# Patient Record
Sex: FEMALE | Race: WHITE | NOT HISPANIC OR LATINO | Employment: UNEMPLOYED | URBAN - NONMETROPOLITAN AREA
[De-identification: names, ages, dates, MRNs, and addresses within clinical notes are randomized per-mention and may not be internally consistent; named-entity substitution may affect disease eponyms.]

---

## 2017-04-26 ENCOUNTER — TRANSCRIBE ORDERS (OUTPATIENT)
Dept: ADMINISTRATIVE | Facility: HOSPITAL | Age: 72
End: 2017-04-26

## 2017-04-26 DIAGNOSIS — R41.3 MEMORY LOSS: Primary | ICD-10-CM

## 2017-04-26 DIAGNOSIS — R41.2 AMNESIA (RETROGRADE): ICD-10-CM

## 2017-04-27 ENCOUNTER — HOSPITAL ENCOUNTER (OUTPATIENT)
Dept: CT IMAGING | Facility: HOSPITAL | Age: 72
Discharge: HOME | End: 2017-04-27
Admitting: NURSE PRACTITIONER

## 2017-04-27 DIAGNOSIS — R41.2 AMNESIA (RETROGRADE): ICD-10-CM

## 2017-04-27 DIAGNOSIS — R41.3 MEMORY LOSS: ICD-10-CM

## 2017-04-27 LAB — CREAT BLDA-MCNC: 0.7 MG/DL (ref 0.6–1.3)

## 2017-04-27 PROCEDURE — 82565 ASSAY OF CREATININE: CPT

## 2017-04-27 PROCEDURE — 0 IOPAMIDOL 61 % SOLUTION: Performed by: NURSE PRACTITIONER

## 2017-04-27 PROCEDURE — 70470 CT HEAD/BRAIN W/O & W/DYE: CPT

## 2017-04-27 PROCEDURE — 70470 CT HEAD/BRAIN W/O & W/DYE: CPT | Performed by: RADIOLOGY

## 2017-04-27 RX ADMIN — IOPAMIDOL 100 ML: 612 INJECTION, SOLUTION INTRAVENOUS at 09:30

## 2017-05-23 ENCOUNTER — LAB (OUTPATIENT)
Dept: LAB | Facility: HOSPITAL | Age: 72
End: 2017-05-23

## 2017-05-23 ENCOUNTER — HOSPITAL ENCOUNTER (OUTPATIENT)
Dept: GENERAL RADIOLOGY | Facility: HOSPITAL | Age: 72
Discharge: HOME | End: 2017-05-23
Admitting: NURSE PRACTITIONER

## 2017-05-23 ENCOUNTER — TRANSCRIBE ORDERS (OUTPATIENT)
Dept: ADMINISTRATIVE | Facility: HOSPITAL | Age: 72
End: 2017-05-23

## 2017-05-23 DIAGNOSIS — R41.3 MEMORY LOSS: ICD-10-CM

## 2017-05-23 DIAGNOSIS — J40 BRONCHITIS, NOT SPECIFIED AS ACUTE OR CHRONIC: ICD-10-CM

## 2017-05-23 DIAGNOSIS — I10 ESSENTIAL HYPERTENSION, BENIGN: ICD-10-CM

## 2017-05-23 DIAGNOSIS — R41.3 MEMORY LOSS: Primary | ICD-10-CM

## 2017-05-23 LAB
ALBUMIN SERPL-MCNC: 3.6 G/DL (ref 3.4–4.8)
ALBUMIN UR-MCNC: 0.9 MG/L
ALBUMIN/GLOB SERPL: 0.9 G/DL (ref 1.5–2.5)
ALP SERPL-CCNC: 80 U/L (ref 35–104)
ALT SERPL W P-5'-P-CCNC: 13 U/L (ref 10–36)
ANION GAP SERPL CALCULATED.3IONS-SCNC: 1.6 MMOL/L (ref 3.6–11.2)
AST SERPL-CCNC: 20 U/L (ref 10–30)
BILIRUB SERPL-MCNC: 0.3 MG/DL (ref 0.2–1.8)
BUN BLD-MCNC: 9 MG/DL (ref 7–21)
BUN/CREAT SERPL: 13.8 (ref 7–25)
CALCIUM SPEC-SCNC: 9.3 MG/DL (ref 7.7–10)
CHLORIDE SERPL-SCNC: 101 MMOL/L (ref 99–112)
CHOLEST SERPL-MCNC: 153 MG/DL (ref 0–200)
CO2 SERPL-SCNC: 35.4 MMOL/L (ref 24.3–31.9)
CREAT BLD-MCNC: 0.65 MG/DL (ref 0.43–1.29)
CREAT UR-MCNC: 25.8 MG/DL
DEPRECATED RDW RBC AUTO: 44.1 FL (ref 37–54)
EOSINOPHIL # BLD MANUAL: 0.27 10*3/MM3 (ref 0–0.7)
EOSINOPHIL NFR BLD MANUAL: 4 % (ref 0–7)
ERYTHROCYTE [DISTWIDTH] IN BLOOD BY AUTOMATED COUNT: 14 % (ref 11.5–14.5)
GFR SERPL CREATININE-BSD FRML MDRD: 90 ML/MIN/1.73
GLOBULIN UR ELPH-MCNC: 3.9 GM/DL
GLUCOSE BLD-MCNC: 76 MG/DL (ref 70–110)
HCT VFR BLD AUTO: 39.5 % (ref 37–47)
HDLC SERPL-MCNC: 64 MG/DL (ref 60–100)
HGB BLD-MCNC: 12.4 G/DL (ref 12–16)
LARGE PLATELETS: NORMAL
LDLC SERPL CALC-MCNC: 61 MG/DL (ref 0–100)
LDLC/HDLC SERPL: 0.96 {RATIO}
LYMPHOCYTES # BLD MANUAL: 1.91 10*3/MM3 (ref 1–3)
LYMPHOCYTES NFR BLD MANUAL: 2 % (ref 0–12)
LYMPHOCYTES NFR BLD MANUAL: 28 % (ref 16–46)
MCH RBC QN AUTO: 27.5 PG (ref 27–33)
MCHC RBC AUTO-ENTMCNC: 31.4 G/DL (ref 33–37)
MCV RBC AUTO: 87.6 FL (ref 80–94)
MICROALBUMIN/CREAT UR: 3.5 MG/G
MONOCYTES # BLD AUTO: 0.14 10*3/MM3 (ref 0.1–0.9)
NEUTROPHILS # BLD AUTO: 4.51 10*3/MM3 (ref 1.4–6.5)
NEUTROPHILS NFR BLD MANUAL: 66 % (ref 40–75)
OSMOLALITY SERPL CALC.SUM OF ELEC: 273.1 MOSM/KG (ref 273–305)
PLATELET # BLD AUTO: 318 10*3/MM3 (ref 130–400)
PMV BLD AUTO: 8.9 FL (ref 6–10)
POTASSIUM BLD-SCNC: 4.2 MMOL/L (ref 3.5–5.3)
PROT SERPL-MCNC: 7.5 G/DL (ref 6–8)
RBC # BLD AUTO: 4.51 10*6/MM3 (ref 4.2–5.4)
RBC MORPH BLD: NORMAL
SODIUM BLD-SCNC: 138 MMOL/L (ref 135–153)
TRIGL SERPL-MCNC: 138 MG/DL (ref 0–150)
TSH SERPL DL<=0.05 MIU/L-ACNC: 4.57 MIU/ML (ref 0.55–4.78)
VLDLC SERPL-MCNC: 27.6 MG/DL
WBC NRBC COR # BLD: 6.83 10*3/MM3 (ref 4.5–12.5)

## 2017-05-23 PROCEDURE — 71020 HC CHEST PA AND LATERAL: CPT

## 2017-05-23 PROCEDURE — 82043 UR ALBUMIN QUANTITATIVE: CPT | Performed by: NURSE PRACTITIONER

## 2017-05-23 PROCEDURE — 71020 XR CHEST PA AND LATERAL: CPT | Performed by: RADIOLOGY

## 2017-05-23 PROCEDURE — 80061 LIPID PANEL: CPT | Performed by: NURSE PRACTITIONER

## 2017-05-23 PROCEDURE — 85027 COMPLETE CBC AUTOMATED: CPT | Performed by: NURSE PRACTITIONER

## 2017-05-23 PROCEDURE — 82570 ASSAY OF URINE CREATININE: CPT | Performed by: NURSE PRACTITIONER

## 2017-05-23 PROCEDURE — 80053 COMPREHEN METABOLIC PANEL: CPT | Performed by: NURSE PRACTITIONER

## 2017-05-23 PROCEDURE — 36415 COLL VENOUS BLD VENIPUNCTURE: CPT

## 2017-05-23 PROCEDURE — 84443 ASSAY THYROID STIM HORMONE: CPT | Performed by: NURSE PRACTITIONER

## 2017-05-23 PROCEDURE — 85007 BL SMEAR W/DIFF WBC COUNT: CPT | Performed by: NURSE PRACTITIONER

## 2017-06-13 ENCOUNTER — HOSPITAL ENCOUNTER (EMERGENCY)
Facility: HOSPITAL | Age: 72
Discharge: OTHER INSTITUTION | End: 2017-06-14
Attending: EMERGENCY MEDICINE | Admitting: EMERGENCY MEDICINE

## 2017-06-13 ENCOUNTER — APPOINTMENT (OUTPATIENT)
Dept: CT IMAGING | Facility: HOSPITAL | Age: 72
End: 2017-06-13

## 2017-06-13 ENCOUNTER — APPOINTMENT (OUTPATIENT)
Dept: GENERAL RADIOLOGY | Facility: HOSPITAL | Age: 72
End: 2017-06-13

## 2017-06-13 DIAGNOSIS — R56.9 NEW ONSET SEIZURE (HCC): Primary | ICD-10-CM

## 2017-06-13 LAB
A-A DO2: 24.8 MMHG (ref 0–300)
ALBUMIN SERPL-MCNC: 3.7 G/DL (ref 3.4–4.8)
ALBUMIN/GLOB SERPL: 1 G/DL (ref 1.5–2.5)
ALP SERPL-CCNC: 77 U/L (ref 35–104)
ALT SERPL W P-5'-P-CCNC: 10 U/L (ref 10–36)
ANION GAP SERPL CALCULATED.3IONS-SCNC: 10.9 MMOL/L (ref 3.6–11.2)
APTT PPP: 24.3 SECONDS (ref 24.4–31)
ARTERIAL PATENCY WRIST A: ABNORMAL
AST SERPL-CCNC: 22 U/L (ref 10–30)
ATMOSPHERIC PRESS: 731 MMHG
BASE EXCESS BLDA CALC-SCNC: 0.1 MMOL/L
BASOPHILS # BLD AUTO: 0.01 10*3/MM3 (ref 0–0.3)
BASOPHILS NFR BLD AUTO: 0.2 % (ref 0–2)
BDY SITE: ABNORMAL
BILIRUB SERPL-MCNC: 0.4 MG/DL (ref 0.2–1.8)
BILIRUB UR QL STRIP: NEGATIVE
BODY TEMPERATURE: 98.9 C
BUN BLD-MCNC: 17 MG/DL (ref 7–21)
BUN/CREAT SERPL: 21.8 (ref 7–25)
CALCIUM SPEC-SCNC: 9.7 MG/DL (ref 7.7–10)
CHLORIDE SERPL-SCNC: 100 MMOL/L (ref 99–112)
CK MB SERPL-CCNC: 2.87 NG/ML (ref 0–5)
CK MB SERPL-RTO: 2.5 % (ref 0–3)
CK SERPL-CCNC: 113 U/L (ref 24–173)
CLARITY UR: CLEAR
CO2 SERPL-SCNC: 26.1 MMOL/L (ref 24.3–31.9)
COHGB MFR BLD: 1.4 % (ref 0–5)
COLOR UR: ABNORMAL
CREAT BLD-MCNC: 0.78 MG/DL (ref 0.43–1.29)
D-LACTATE SERPL-SCNC: 1.1 MMOL/L (ref 0.5–2)
DEPRECATED RDW RBC AUTO: 47.7 FL (ref 37–54)
DIGOXIN SERPL-MCNC: 0.1 NG/ML (ref 0.8–2)
EOSINOPHIL # BLD AUTO: 0.33 10*3/MM3 (ref 0–0.7)
EOSINOPHIL NFR BLD AUTO: 5 % (ref 0–7)
ERYTHROCYTE [DISTWIDTH] IN BLOOD BY AUTOMATED COUNT: 14.9 % (ref 11.5–14.5)
GFR SERPL CREATININE-BSD FRML MDRD: 72 ML/MIN/1.73
GLOBULIN UR ELPH-MCNC: 3.8 GM/DL
GLUCOSE BLD-MCNC: 117 MG/DL (ref 70–110)
GLUCOSE UR STRIP-MCNC: NEGATIVE MG/DL
HCO3 BLDA-SCNC: 25.5 MMOL/L (ref 22–26)
HCT VFR BLD AUTO: 37.8 % (ref 37–47)
HCT VFR BLD CALC: 36 % (ref 37–47)
HGB BLD-MCNC: 12.1 G/DL (ref 12–16)
HGB BLDA-MCNC: 12.4 G/DL (ref 12–16)
HGB UR QL STRIP.AUTO: NEGATIVE
HOROWITZ INDEX BLD+IHG-RTO: 21 %
IMM GRANULOCYTES # BLD: 0.01 10*3/MM3 (ref 0–0.03)
IMM GRANULOCYTES NFR BLD: 0.2 % (ref 0–0.5)
INR PPP: 0.96 (ref 0.8–1.1)
KETONES UR QL STRIP: ABNORMAL
LEUKOCYTE ESTERASE UR QL STRIP.AUTO: NEGATIVE
LYMPHOCYTES # BLD AUTO: 1.26 10*3/MM3 (ref 1–3)
LYMPHOCYTES NFR BLD AUTO: 19 % (ref 16–46)
MAGNESIUM SERPL-MCNC: 2.2 MG/DL (ref 1.7–2.6)
MCH RBC QN AUTO: 28.1 PG (ref 27–33)
MCHC RBC AUTO-ENTMCNC: 32 G/DL (ref 33–37)
MCV RBC AUTO: 87.7 FL (ref 80–94)
METHGB BLD QL: 0.3 % (ref 0–3)
MODALITY: ABNORMAL
MONOCYTES # BLD AUTO: 0.37 10*3/MM3 (ref 0.1–0.9)
MONOCYTES NFR BLD AUTO: 5.6 % (ref 0–12)
MYOGLOBIN SERPL-MCNC: 135 NG/ML (ref 0–109)
NEUTROPHILS # BLD AUTO: 4.65 10*3/MM3 (ref 1.4–6.5)
NEUTROPHILS NFR BLD AUTO: 70 % (ref 40–75)
NITRITE UR QL STRIP: NEGATIVE
OSMOLALITY SERPL CALC.SUM OF ELEC: 276.4 MOSM/KG (ref 273–305)
OXYHGB MFR BLDV: 89.9 % (ref 85–100)
PCO2 BLDA: 44.4 MM HG (ref 35–45)
PH BLDA: 7.38 PH UNITS (ref 7.35–7.45)
PH UR STRIP.AUTO: 5.5 [PH] (ref 5–8)
PLATELET # BLD AUTO: 235 10*3/MM3 (ref 130–400)
PMV BLD AUTO: 9.1 FL (ref 6–10)
PO2 BLDA: 64.2 MM HG (ref 80–100)
POTASSIUM BLD-SCNC: 4.4 MMOL/L (ref 3.5–5.3)
PROT SERPL-MCNC: 7.5 G/DL (ref 6–8)
PROT UR QL STRIP: NEGATIVE
PROTHROMBIN TIME: 10.6 SECONDS (ref 9.8–11.9)
RBC # BLD AUTO: 4.31 10*6/MM3 (ref 4.2–5.4)
SAO2 % BLDCOA: 91.5 % (ref 90–100)
SODIUM BLD-SCNC: 137 MMOL/L (ref 135–153)
SP GR UR STRIP: 1.02 (ref 1–1.03)
TROPONIN I SERPL-MCNC: <0.006 NG/ML
UROBILINOGEN UR QL STRIP: ABNORMAL
WBC NRBC COR # BLD: 6.63 10*3/MM3 (ref 4.5–12.5)

## 2017-06-13 PROCEDURE — 36600 WITHDRAWAL OF ARTERIAL BLOOD: CPT | Performed by: EMERGENCY MEDICINE

## 2017-06-13 PROCEDURE — 80162 ASSAY OF DIGOXIN TOTAL: CPT | Performed by: EMERGENCY MEDICINE

## 2017-06-13 PROCEDURE — 85730 THROMBOPLASTIN TIME PARTIAL: CPT | Performed by: EMERGENCY MEDICINE

## 2017-06-13 PROCEDURE — 83050 HGB METHEMOGLOBIN QUAN: CPT | Performed by: EMERGENCY MEDICINE

## 2017-06-13 PROCEDURE — 82805 BLOOD GASES W/O2 SATURATION: CPT | Performed by: EMERGENCY MEDICINE

## 2017-06-13 PROCEDURE — 99285 EMERGENCY DEPT VISIT HI MDM: CPT

## 2017-06-13 PROCEDURE — 82550 ASSAY OF CK (CPK): CPT | Performed by: EMERGENCY MEDICINE

## 2017-06-13 PROCEDURE — 84484 ASSAY OF TROPONIN QUANT: CPT | Performed by: EMERGENCY MEDICINE

## 2017-06-13 PROCEDURE — 85025 COMPLETE CBC W/AUTO DIFF WBC: CPT | Performed by: EMERGENCY MEDICINE

## 2017-06-13 PROCEDURE — 70450 CT HEAD/BRAIN W/O DYE: CPT

## 2017-06-13 PROCEDURE — 71010 XR CHEST 1 VW: CPT | Performed by: RADIOLOGY

## 2017-06-13 PROCEDURE — 93010 ELECTROCARDIOGRAM REPORT: CPT | Performed by: INTERNAL MEDICINE

## 2017-06-13 PROCEDURE — 82553 CREATINE MB FRACTION: CPT | Performed by: EMERGENCY MEDICINE

## 2017-06-13 PROCEDURE — 81003 URINALYSIS AUTO W/O SCOPE: CPT | Performed by: EMERGENCY MEDICINE

## 2017-06-13 PROCEDURE — 87040 BLOOD CULTURE FOR BACTERIA: CPT | Performed by: EMERGENCY MEDICINE

## 2017-06-13 PROCEDURE — 71010 HC CHEST PA OR AP: CPT

## 2017-06-13 PROCEDURE — 82375 ASSAY CARBOXYHB QUANT: CPT | Performed by: EMERGENCY MEDICINE

## 2017-06-13 PROCEDURE — 36415 COLL VENOUS BLD VENIPUNCTURE: CPT

## 2017-06-13 PROCEDURE — 80053 COMPREHEN METABOLIC PANEL: CPT | Performed by: EMERGENCY MEDICINE

## 2017-06-13 PROCEDURE — 93005 ELECTROCARDIOGRAM TRACING: CPT | Performed by: EMERGENCY MEDICINE

## 2017-06-13 PROCEDURE — 83735 ASSAY OF MAGNESIUM: CPT | Performed by: EMERGENCY MEDICINE

## 2017-06-13 PROCEDURE — 83605 ASSAY OF LACTIC ACID: CPT | Performed by: EMERGENCY MEDICINE

## 2017-06-13 PROCEDURE — 70450 CT HEAD/BRAIN W/O DYE: CPT | Performed by: RADIOLOGY

## 2017-06-13 PROCEDURE — 85610 PROTHROMBIN TIME: CPT | Performed by: EMERGENCY MEDICINE

## 2017-06-13 PROCEDURE — 83874 ASSAY OF MYOGLOBIN: CPT | Performed by: EMERGENCY MEDICINE

## 2017-06-13 RX ORDER — LORAZEPAM 2 MG/ML
INJECTION INTRAMUSCULAR
Status: COMPLETED
Start: 2017-06-13 | End: 2017-06-14

## 2017-06-13 RX ORDER — SODIUM CHLORIDE 0.9 % (FLUSH) 0.9 %
10 SYRINGE (ML) INJECTION AS NEEDED
Status: DISCONTINUED | OUTPATIENT
Start: 2017-06-13 | End: 2017-06-14 | Stop reason: HOSPADM

## 2017-06-14 ENCOUNTER — APPOINTMENT (OUTPATIENT)
Dept: MRI IMAGING | Facility: HOSPITAL | Age: 72
End: 2017-06-14

## 2017-06-14 ENCOUNTER — HOSPITAL ENCOUNTER (INPATIENT)
Facility: HOSPITAL | Age: 72
LOS: 1 days | Discharge: HOME | End: 2017-06-15
Attending: INTERNAL MEDICINE | Admitting: INTERNAL MEDICINE

## 2017-06-14 ENCOUNTER — APPOINTMENT (OUTPATIENT)
Dept: NEUROLOGY | Facility: HOSPITAL | Age: 72
End: 2017-06-14
Attending: INTERNAL MEDICINE

## 2017-06-14 VITALS
HEART RATE: 83 BPM | WEIGHT: 140 LBS | TEMPERATURE: 98.1 F | BODY MASS INDEX: 23.9 KG/M2 | SYSTOLIC BLOOD PRESSURE: 161 MMHG | HEIGHT: 64 IN | RESPIRATION RATE: 18 BRPM | OXYGEN SATURATION: 97 % | DIASTOLIC BLOOD PRESSURE: 77 MMHG

## 2017-06-14 PROBLEM — J42 CHRONIC BRONCHITIS (HCC): Status: ACTIVE | Noted: 2017-06-14

## 2017-06-14 PROBLEM — R56.9 SEIZURE (HCC): Status: ACTIVE | Noted: 2017-06-14

## 2017-06-14 PROBLEM — R56.9 NEW ONSET SEIZURE (HCC): Status: ACTIVE | Noted: 2017-06-14

## 2017-06-14 PROBLEM — I25.5 ISCHEMIC CARDIOMYOPATHY: Status: ACTIVE | Noted: 2017-06-14

## 2017-06-14 PROBLEM — R82.81 PYURIA: Status: ACTIVE | Noted: 2017-06-14

## 2017-06-14 LAB
AMPHET+METHAMPHET UR QL: NEGATIVE
AMPHETAMINES UR QL: NEGATIVE
ANION GAP SERPL CALCULATED.3IONS-SCNC: 5 MMOL/L (ref 3–11)
APPEARANCE CSF: CLEAR
APPEARANCE CSF: CLEAR
BACTERIA UR QL AUTO: ABNORMAL /HPF
BARBITURATES UR QL SCN: NEGATIVE
BENZODIAZ UR QL SCN: POSITIVE
BILIRUB UR QL STRIP: NEGATIVE
BNP SERPL-MCNC: 119 PG/ML (ref 0–100)
BUN BLD-MCNC: 11 MG/DL (ref 9–23)
BUN/CREAT SERPL: 22 (ref 7–25)
BUPRENORPHINE SERPL-MCNC: NEGATIVE NG/ML
CALCIUM SPEC-SCNC: 9.1 MG/DL (ref 8.7–10.4)
CANNABINOIDS SERPL QL: NEGATIVE
CHLORIDE SERPL-SCNC: 102 MMOL/L (ref 99–109)
CK SERPL-CCNC: 288 U/L (ref 26–174)
CLARITY UR: CLEAR
CO2 SERPL-SCNC: 28 MMOL/L (ref 20–31)
COCAINE UR QL: NEGATIVE
COLOR CSF: COLORLESS
COLOR CSF: COLORLESS
COLOR UR: YELLOW
CREAT BLD-MCNC: 0.5 MG/DL (ref 0.6–1.3)
DEPRECATED RDW RBC AUTO: 49.5 FL (ref 37–54)
ERYTHROCYTE [DISTWIDTH] IN BLOOD BY AUTOMATED COUNT: 15 % (ref 11.3–14.5)
GFR SERPL CREATININE-BSD FRML MDRD: 121 ML/MIN/1.73
GLUCOSE BLD-MCNC: 76 MG/DL (ref 70–100)
GLUCOSE CSF-MCNC: 62 MG/DL (ref 50–75)
GLUCOSE UR STRIP-MCNC: NEGATIVE MG/DL
HCT VFR BLD AUTO: 37.8 % (ref 34.5–44)
HGB BLD-MCNC: 11.9 G/DL (ref 11.5–15.5)
HGB UR QL STRIP.AUTO: ABNORMAL
HYALINE CASTS UR QL AUTO: ABNORMAL /LPF
KETONES UR QL STRIP: ABNORMAL
LEUKOCYTE ESTERASE UR QL STRIP.AUTO: ABNORMAL
MAGNESIUM SERPL-MCNC: 2 MG/DL (ref 1.3–2.7)
MCH RBC QN AUTO: 28.3 PG (ref 27–31)
MCHC RBC AUTO-ENTMCNC: 31.5 G/DL (ref 32–36)
MCV RBC AUTO: 90 FL (ref 80–99)
METHADONE UR QL SCN: NEGATIVE
NITRITE UR QL STRIP: NEGATIVE
NUC CELL # CSF MANUAL: 0 /MM3 (ref 0–5)
NUC CELL # CSF MANUAL: 1 /MM3 (ref 0–5)
OPIATES UR QL: POSITIVE
OXYCODONE UR QL SCN: NEGATIVE
PCP UR QL SCN: NEGATIVE
PH UR STRIP.AUTO: 5.5 [PH] (ref 5–8)
PLATELET # BLD AUTO: 206 10*3/MM3 (ref 150–450)
PMV BLD AUTO: 8.8 FL (ref 6–12)
POTASSIUM BLD-SCNC: 4 MMOL/L (ref 3.5–5.5)
PROPOXYPH UR QL: NEGATIVE
PROT CSF-MCNC: 24.7 MG/DL (ref 15–45)
PROT UR QL STRIP: ABNORMAL
RBC # BLD AUTO: 4.2 10*6/MM3 (ref 3.89–5.14)
RBC # CSF MANUAL: 0 /MM3 (ref 0–0)
RBC # CSF MANUAL: 29 /MM3 (ref 0–0)
RBC # UR: ABNORMAL /HPF
REF LAB TEST METHOD: ABNORMAL
SODIUM BLD-SCNC: 135 MMOL/L (ref 132–146)
SP GR UR STRIP: 1.02 (ref 1–1.03)
SQUAMOUS #/AREA URNS HPF: ABNORMAL /HPF
TRICYCLICS UR QL SCN: POSITIVE
TROPONIN I SERPL-MCNC: <0.006 NG/ML
TUBE # CSF: 1
TUBE # CSF: 4
UROBILINOGEN UR QL STRIP: ABNORMAL
WBC NRBC COR # BLD: 8.67 10*3/MM3 (ref 3.5–10.8)
WBC UR QL AUTO: ABNORMAL /HPF

## 2017-06-14 PROCEDURE — 87102 FUNGUS ISOLATION CULTURE: CPT | Performed by: EMERGENCY MEDICINE

## 2017-06-14 PROCEDURE — 96375 TX/PRO/DX INJ NEW DRUG ADDON: CPT

## 2017-06-14 PROCEDURE — 99223 1ST HOSP IP/OBS HIGH 75: CPT | Performed by: PSYCHIATRY & NEUROLOGY

## 2017-06-14 PROCEDURE — 80306 DRUG TEST PRSMV INSTRMNT: CPT | Performed by: INTERNAL MEDICINE

## 2017-06-14 PROCEDURE — 87205 SMEAR GRAM STAIN: CPT | Performed by: EMERGENCY MEDICINE

## 2017-06-14 PROCEDURE — 93005 ELECTROCARDIOGRAM TRACING: CPT | Performed by: INTERNAL MEDICINE

## 2017-06-14 PROCEDURE — 95819 EEG AWAKE AND ASLEEP: CPT

## 2017-06-14 PROCEDURE — 94760 N-INVAS EAR/PLS OXIMETRY 1: CPT

## 2017-06-14 PROCEDURE — 25010000002 METHYLPREDNISOLONE PER 40 MG: Performed by: INTERNAL MEDICINE

## 2017-06-14 PROCEDURE — 87206 SMEAR FLUORESCENT/ACID STAI: CPT | Performed by: EMERGENCY MEDICINE

## 2017-06-14 PROCEDURE — 96361 HYDRATE IV INFUSION ADD-ON: CPT

## 2017-06-14 PROCEDURE — 87529 HSV DNA AMP PROBE: CPT | Performed by: EMERGENCY MEDICINE

## 2017-06-14 PROCEDURE — 83735 ASSAY OF MAGNESIUM: CPT | Performed by: INTERNAL MEDICINE

## 2017-06-14 PROCEDURE — 25010000002 LEVETRIRACETAM PER 10 MG: Performed by: EMERGENCY MEDICINE

## 2017-06-14 PROCEDURE — 85027 COMPLETE CBC AUTOMATED: CPT | Performed by: INTERNAL MEDICINE

## 2017-06-14 PROCEDURE — 94799 UNLISTED PULMONARY SVC/PX: CPT

## 2017-06-14 PROCEDURE — 25010000002 ENOXAPARIN PER 10 MG: Performed by: PHYSICIAN ASSISTANT

## 2017-06-14 PROCEDURE — 81001 URINALYSIS AUTO W/SCOPE: CPT | Performed by: INTERNAL MEDICINE

## 2017-06-14 PROCEDURE — 96374 THER/PROPH/DIAG INJ IV PUSH: CPT

## 2017-06-14 PROCEDURE — 94640 AIRWAY INHALATION TREATMENT: CPT

## 2017-06-14 PROCEDURE — 83916 OLIGOCLONAL BANDS: CPT | Performed by: EMERGENCY MEDICINE

## 2017-06-14 PROCEDURE — 87899 AGENT NOS ASSAY W/OPTIC: CPT | Performed by: EMERGENCY MEDICINE

## 2017-06-14 PROCEDURE — 83880 ASSAY OF NATRIURETIC PEPTIDE: CPT | Performed by: PHYSICIAN ASSISTANT

## 2017-06-14 PROCEDURE — 84157 ASSAY OF PROTEIN OTHER: CPT | Performed by: EMERGENCY MEDICINE

## 2017-06-14 PROCEDURE — 87075 CULTR BACTERIA EXCEPT BLOOD: CPT | Performed by: EMERGENCY MEDICINE

## 2017-06-14 PROCEDURE — 25010000002 LORAZEPAM PER 2 MG

## 2017-06-14 PROCEDURE — 87116 MYCOBACTERIA CULTURE: CPT | Performed by: EMERGENCY MEDICINE

## 2017-06-14 PROCEDURE — 84484 ASSAY OF TROPONIN QUANT: CPT | Performed by: INTERNAL MEDICINE

## 2017-06-14 PROCEDURE — 99223 1ST HOSP IP/OBS HIGH 75: CPT | Performed by: INTERNAL MEDICINE

## 2017-06-14 PROCEDURE — 82550 ASSAY OF CK (CPK): CPT | Performed by: INTERNAL MEDICINE

## 2017-06-14 PROCEDURE — 82945 GLUCOSE OTHER FLUID: CPT | Performed by: EMERGENCY MEDICINE

## 2017-06-14 PROCEDURE — 87070 CULTURE OTHR SPECIMN AEROBIC: CPT | Performed by: EMERGENCY MEDICINE

## 2017-06-14 PROCEDURE — 80048 BASIC METABOLIC PNL TOTAL CA: CPT | Performed by: INTERNAL MEDICINE

## 2017-06-14 PROCEDURE — 70551 MRI BRAIN STEM W/O DYE: CPT

## 2017-06-14 PROCEDURE — 93010 ELECTROCARDIOGRAM REPORT: CPT | Performed by: INTERNAL MEDICINE

## 2017-06-14 PROCEDURE — 25010000003 CEFTRIAXONE PER 250 MG: Performed by: PHYSICIAN ASSISTANT

## 2017-06-14 PROCEDURE — 87015 SPECIMEN INFECT AGNT CONCNTJ: CPT | Performed by: EMERGENCY MEDICINE

## 2017-06-14 PROCEDURE — 89050 BODY FLUID CELL COUNT: CPT | Performed by: EMERGENCY MEDICINE

## 2017-06-14 PROCEDURE — 88112 CYTOPATH CELL ENHANCE TECH: CPT | Performed by: EMERGENCY MEDICINE

## 2017-06-14 PROCEDURE — 86592 SYPHILIS TEST NON-TREP QUAL: CPT | Performed by: EMERGENCY MEDICINE

## 2017-06-14 RX ORDER — LORAZEPAM 2 MG/ML
2 INJECTION INTRAMUSCULAR ONCE
Status: COMPLETED | OUTPATIENT
Start: 2017-06-14 | End: 2017-06-14

## 2017-06-14 RX ORDER — SODIUM CHLORIDE 0.9 % (FLUSH) 0.9 %
1-10 SYRINGE (ML) INJECTION AS NEEDED
Status: DISCONTINUED | OUTPATIENT
Start: 2017-06-14 | End: 2017-06-15 | Stop reason: HOSPADM

## 2017-06-14 RX ORDER — METHYLPREDNISOLONE SODIUM SUCCINATE 40 MG/ML
40 INJECTION, POWDER, LYOPHILIZED, FOR SOLUTION INTRAMUSCULAR; INTRAVENOUS EVERY 12 HOURS
Status: DISCONTINUED | OUTPATIENT
Start: 2017-06-14 | End: 2017-06-15 | Stop reason: HOSPADM

## 2017-06-14 RX ORDER — ACETAMINOPHEN 325 MG/1
650 TABLET ORAL EVERY 4 HOURS PRN
Status: DISCONTINUED | OUTPATIENT
Start: 2017-06-14 | End: 2017-06-15 | Stop reason: HOSPADM

## 2017-06-14 RX ORDER — GUAIFENESIN 600 MG/1
600 TABLET, EXTENDED RELEASE ORAL EVERY 12 HOURS SCHEDULED
Status: DISCONTINUED | OUTPATIENT
Start: 2017-06-14 | End: 2017-06-14

## 2017-06-14 RX ORDER — TRAMADOL HYDROCHLORIDE 50 MG/1
50 TABLET ORAL EVERY 8 HOURS PRN
COMMUNITY
End: 2018-04-19

## 2017-06-14 RX ORDER — HYDROXYZINE PAMOATE 25 MG/1
25 CAPSULE ORAL 3 TIMES DAILY PRN
COMMUNITY
End: 2017-06-15 | Stop reason: HOSPADM

## 2017-06-14 RX ORDER — SODIUM CHLORIDE 9 MG/ML
125 INJECTION, SOLUTION INTRAVENOUS CONTINUOUS
Status: DISCONTINUED | OUTPATIENT
Start: 2017-06-14 | End: 2017-06-14 | Stop reason: HOSPADM

## 2017-06-14 RX ORDER — BUDESONIDE 0.5 MG/2ML
0.5 INHALANT ORAL
Status: DISCONTINUED | OUTPATIENT
Start: 2017-06-14 | End: 2017-06-15 | Stop reason: HOSPADM

## 2017-06-14 RX ORDER — DOXYCYCLINE HYCLATE 100 MG/1
100 CAPSULE ORAL EVERY 12 HOURS
Status: DISCONTINUED | OUTPATIENT
Start: 2017-06-15 | End: 2017-06-15 | Stop reason: HOSPADM

## 2017-06-14 RX ORDER — LORAZEPAM 2 MG/ML
2 INJECTION INTRAMUSCULAR ONCE
Status: DISCONTINUED | OUTPATIENT
Start: 2017-06-14 | End: 2017-06-14

## 2017-06-14 RX ORDER — HYDROCODONE BITARTRATE AND ACETAMINOPHEN 5; 325 MG/1; MG/1
1 TABLET ORAL EVERY 6 HOURS PRN
Status: DISCONTINUED | OUTPATIENT
Start: 2017-06-14 | End: 2017-06-15 | Stop reason: HOSPADM

## 2017-06-14 RX ORDER — SODIUM CHLORIDE 9 MG/ML
INJECTION, SOLUTION INTRAVENOUS
Status: COMPLETED
Start: 2017-06-14 | End: 2017-06-14

## 2017-06-14 RX ORDER — DOXYCYCLINE HYCLATE 100 MG/1
100 CAPSULE ORAL EVERY 12 HOURS SCHEDULED
Status: DISCONTINUED | OUTPATIENT
Start: 2017-06-14 | End: 2017-06-14

## 2017-06-14 RX ORDER — GUAIFENESIN 600 MG/1
600 TABLET, EXTENDED RELEASE ORAL EVERY 12 HOURS
Status: DISCONTINUED | OUTPATIENT
Start: 2017-06-15 | End: 2017-06-15 | Stop reason: HOSPADM

## 2017-06-14 RX ORDER — IPRATROPIUM BROMIDE AND ALBUTEROL SULFATE 2.5; .5 MG/3ML; MG/3ML
3 SOLUTION RESPIRATORY (INHALATION)
Status: DISCONTINUED | OUTPATIENT
Start: 2017-06-14 | End: 2017-06-15 | Stop reason: HOSPADM

## 2017-06-14 RX ORDER — CLONAZEPAM 0.5 MG/1
0.5 TABLET ORAL 2 TIMES DAILY
COMMUNITY

## 2017-06-14 RX ORDER — HYDROCODONE BITARTRATE AND ACETAMINOPHEN 5; 325 MG/1; MG/1
1 TABLET ORAL 2 TIMES DAILY
COMMUNITY
End: 2018-04-19

## 2017-06-14 RX ORDER — IPRATROPIUM BROMIDE AND ALBUTEROL SULFATE 2.5; .5 MG/3ML; MG/3ML
3 SOLUTION RESPIRATORY (INHALATION) EVERY 4 HOURS PRN
Status: DISCONTINUED | OUTPATIENT
Start: 2017-06-14 | End: 2017-06-15 | Stop reason: HOSPADM

## 2017-06-14 RX ORDER — CEFTRIAXONE SODIUM 1 G/50ML
1 INJECTION, SOLUTION INTRAVENOUS
Status: DISCONTINUED | OUTPATIENT
Start: 2017-06-14 | End: 2017-06-15 | Stop reason: HOSPADM

## 2017-06-14 RX ORDER — LEVETIRACETAM 500 MG/1
500 TABLET ORAL EVERY 12 HOURS SCHEDULED
Status: DISCONTINUED | OUTPATIENT
Start: 2017-06-14 | End: 2017-06-14

## 2017-06-14 RX ORDER — CLONAZEPAM 0.5 MG/1
0.5 TABLET ORAL 2 TIMES DAILY
Status: DISCONTINUED | OUTPATIENT
Start: 2017-06-14 | End: 2017-06-15 | Stop reason: HOSPADM

## 2017-06-14 RX ORDER — ROPINIROLE 1 MG/1
1 TABLET, FILM COATED ORAL NIGHTLY
COMMUNITY

## 2017-06-14 RX ORDER — LEVETIRACETAM 500 MG/1
500 TABLET ORAL EVERY 12 HOURS
Status: DISCONTINUED | OUTPATIENT
Start: 2017-06-15 | End: 2017-06-15 | Stop reason: HOSPADM

## 2017-06-14 RX ORDER — FAMOTIDINE 20 MG/1
40 TABLET, FILM COATED ORAL DAILY
Status: DISCONTINUED | OUTPATIENT
Start: 2017-06-14 | End: 2017-06-15 | Stop reason: HOSPADM

## 2017-06-14 RX ORDER — SENNA AND DOCUSATE SODIUM 50; 8.6 MG/1; MG/1
2 TABLET, FILM COATED ORAL 2 TIMES DAILY
Status: DISCONTINUED | OUTPATIENT
Start: 2017-06-14 | End: 2017-06-15 | Stop reason: HOSPADM

## 2017-06-14 RX ADMIN — SODIUM CHLORIDE 1000 MG: 9 INJECTION, SOLUTION INTRAVENOUS at 00:31

## 2017-06-14 RX ADMIN — CLONAZEPAM 0.5 MG: 0.5 TABLET ORAL at 17:34

## 2017-06-14 RX ADMIN — SODIUM CHLORIDE 125 ML/HR: 9 INJECTION, SOLUTION INTRAVENOUS at 07:02

## 2017-06-14 RX ADMIN — HYDROCODONE BITARTRATE AND ACETAMINOPHEN 1 TABLET: 5; 325 TABLET ORAL at 20:05

## 2017-06-14 RX ADMIN — METHYLPREDNISOLONE SODIUM SUCCINATE 40 MG: 40 INJECTION, POWDER, LYOPHILIZED, FOR SOLUTION INTRAMUSCULAR; INTRAVENOUS at 15:52

## 2017-06-14 RX ADMIN — IPRATROPIUM BROMIDE AND ALBUTEROL SULFATE 3 ML: .5; 3 SOLUTION RESPIRATORY (INHALATION) at 16:20

## 2017-06-14 RX ADMIN — GUAIFENESIN 600 MG: 600 TABLET, EXTENDED RELEASE ORAL at 15:52

## 2017-06-14 RX ADMIN — DOXYCYCLINE HYCLATE 100 MG: 100 CAPSULE ORAL at 15:52

## 2017-06-14 RX ADMIN — LORAZEPAM 2 MG: 2 INJECTION INTRAMUSCULAR; INTRAVENOUS at 00:31

## 2017-06-14 RX ADMIN — IPRATROPIUM BROMIDE AND ALBUTEROL SULFATE 3 ML: .5; 3 SOLUTION RESPIRATORY (INHALATION) at 11:53

## 2017-06-14 RX ADMIN — BUDESONIDE 0.5 MG: 0.5 INHALANT RESPIRATORY (INHALATION) at 19:16

## 2017-06-14 RX ADMIN — ENOXAPARIN SODIUM 40 MG: 40 INJECTION SUBCUTANEOUS at 15:51

## 2017-06-14 RX ADMIN — IPRATROPIUM BROMIDE AND ALBUTEROL SULFATE 3 ML: .5; 3 SOLUTION RESPIRATORY (INHALATION) at 19:16

## 2017-06-14 RX ADMIN — CEFTRIAXONE SODIUM 1 G: 1 INJECTION, SOLUTION INTRAVENOUS at 15:51

## 2017-06-14 RX ADMIN — BUDESONIDE 0.5 MG: 0.5 INHALANT RESPIRATORY (INHALATION) at 16:19

## 2017-06-14 RX ADMIN — FAMOTIDINE 40 MG: 20 TABLET, FILM COATED ORAL at 15:52

## 2017-06-14 RX ADMIN — LEVETIRACETAM 500 MG: 500 TABLET, FILM COATED ORAL at 15:52

## 2017-06-14 RX ADMIN — DOCUSATE SODIUM AND SENNOSIDES 2 TABLET: 8.6; 5 TABLET, FILM COATED ORAL at 17:34

## 2017-06-14 RX ADMIN — LORAZEPAM 2 MG: 2 INJECTION INTRAMUSCULAR at 00:31

## 2017-06-14 NOTE — ED NOTES
Called [facility] dispatch to arrange transportation to [facility]. Dispatcher said she would page it out when one of the trucks on a run became available. No ETA.     Franchesca Wade  06/14/17 0540

## 2017-06-14 NOTE — ED NOTES
Contacted [facility] paged out and accepted would send a truck up here      Ml Hines  06/14/17 0279

## 2017-06-14 NOTE — PLAN OF CARE
Problem: Patient Care Overview (Adult)  Goal: Plan of Care Review  Outcome: Ongoing (interventions implemented as appropriate)  Goal: Adult Individualization and Mutuality  Outcome: Ongoing (interventions implemented as appropriate)  Goal: Discharge Needs Assessment  Outcome: Ongoing (interventions implemented as appropriate)    Problem: Respiratory Insufficiency (Adult)  Goal: Identify Related Risk Factors and Signs and Symptoms  Outcome: Ongoing (interventions implemented as appropriate)  Goal: Acid/Base Balance  Outcome: Ongoing (interventions implemented as appropriate)  Goal: Effective Ventilation  Outcome: Ongoing (interventions implemented as appropriate)    Problem: Seizure Disorder/Epilepsy (Adult)  Goal: Signs and Symptoms of Listed Potential Problems Will be Absent or Manageable (Seizure Disorder/Epilepsy)  Outcome: Ongoing (interventions implemented as appropriate)

## 2017-06-14 NOTE — ED PROVIDER NOTES
Subjective   HPI Comments: Patient found unresponsive.  Family reports that patient had been in her usual state of health.  They were out of her presence for about 10 minutes.  When the family returned she was found unresponsive.  EMS reports that the patient was minimally responsive on arrival.  As she came around she was very confused.   denies any recent illness.    Patient is a 72 y.o. female presenting with altered mental status.   History provided by:  Spouse and EMS personnel  Altered Mental Status   Presenting symptoms: confusion and unresponsiveness    Presenting symptoms: no behavior changes and no combativeness    Severity:  Severe  Most recent episode:  Today  Progression:  Partially resolved  Chronicity:  New  Context: not alcohol use, not dementia, not drug use, not head injury, not homeless, taking medications as prescribed, not nursing home resident, not recent change in medication, not recent illness and not recent infection    Associated symptoms: no abdominal pain, no agitation, no bladder incontinence, no decreased appetite, no depression, no difficulty breathing, no eye deviation, no fever, no hallucinations, no headaches, no light-headedness, no nausea, no palpitations, no rash, no slurred speech, no suicidal behavior, no visual change, no vomiting and no weakness        Review of Systems   Constitutional: Negative.  Negative for decreased appetite and fever.   HENT: Negative.    Eyes: Negative.    Respiratory: Negative.    Cardiovascular: Negative.  Negative for palpitations.   Gastrointestinal: Negative.  Negative for abdominal pain, nausea and vomiting.   Endocrine: Negative.    Genitourinary: Negative.  Negative for bladder incontinence.   Musculoskeletal: Negative.    Skin: Negative.  Negative for rash.   Allergic/Immunologic: Negative.    Neurological: Negative for weakness, light-headedness and headaches.   Hematological: Negative.    Psychiatric/Behavioral: Positive for  confusion. Negative for agitation and hallucinations.   All other systems reviewed and are negative.      Past Medical History:   Diagnosis Date   • Asthma    • COPD (chronic obstructive pulmonary disease)    • Coronary artery disease    • Coronary artery disease    • Ischemic cardiomyopathy    • Myocardial infarction        Allergies   Allergen Reactions   • Codeine Anaphylaxis   • Penicillins Anaphylaxis   • Talwin [Pentazocine] Other (See Comments)     seizures       Past Surgical History:   Procedure Laterality Date   • APPENDECTOMY     • HIP SURGERY Left    • TUBAL ABDOMINAL LIGATION         Family History   Problem Relation Age of Onset   • Heart disease Mother    • COPD Mother    • Stroke Father    • Diabetes Brother    • Heart disease Brother        Social History     Social History   • Marital status:      Spouse name: N/A   • Number of children: N/A   • Years of education: N/A     Social History Main Topics   • Smoking status: Former Smoker   • Smokeless tobacco: None   • Alcohol use No   • Drug use: No   • Sexual activity: Not Asked     Other Topics Concern   • None     Social History Narrative           Objective   Physical Exam   Constitutional: She appears well-developed and well-nourished.   Patient awake alert and cooperative.  She is confused and only oriented to self.   HENT:   Head: Normocephalic and atraumatic.   Right Ear: External ear normal.   Left Ear: External ear normal.   Nose: Nose normal.   Mouth/Throat: Oropharynx is clear and moist. No oropharyngeal exudate.   Eyes: Conjunctivae and EOM are normal. Pupils are equal, round, and reactive to light. Right eye exhibits no discharge. Left eye exhibits no discharge. No scleral icterus.   Neck: Normal range of motion. Neck supple. No tracheal deviation present.   Cardiovascular: Normal rate, regular rhythm, normal heart sounds and intact distal pulses.  Exam reveals no gallop and no friction rub.    No murmur heard.  Pulmonary/Chest:  Effort normal and breath sounds normal. No stridor. No respiratory distress. She has no wheezes. She has no rales. She exhibits no tenderness.   Abdominal: Soft. She exhibits no distension and no mass. There is no tenderness. There is no guarding.   Musculoskeletal: Normal range of motion. She exhibits no tenderness or deformity.   Neurological: She is alert. No cranial nerve deficit. She exhibits normal muscle tone. Coordination normal.   Skin: Skin is warm and dry. No pallor.   Psychiatric:   Awake, alert, pleasant and cooperative, oriented to self only   Nursing note and vitals reviewed.      Bedside Lumbar Puncture  Date/Time: 6/14/2017 1:06 AM  Performed by: JOVON GILL  Authorized by: JOVON GILL     Consent:     Consent obtained:  Verbal and written    Consent given by:  Spouse    Risks discussed:  Bleeding, headache, nerve damage, repeat procedure, pain and infection    Alternatives discussed:  No treatment, delayed treatment, alternative treatment, observation and referral  Pre-procedure details:     Procedure purpose:  Diagnostic    Preparation: Patient was prepped and draped in usual sterile fashion    Procedure details:     Lumbar space:  L4-L5 interspace    Patient position:  Sitting    Needle gauge:  22    Needle type:  Spinal needle - Quincke tip    Needle length (in):  3.5    Ultrasound guidance: no      Number of attempts:  2    Fluid appearance:  Clear    Tubes of fluid:  4    Total volume (ml):  8  Post-procedure:     Puncture site:  Adhesive bandage applied    Patient tolerance of procedure:  Tolerated well, no immediate complications               ED Course  ED Course   Value Comment By Time    Called to bedside by family.  Patient having generalized tonic-clonic seizure.  Spontaneously resolved.  Keppra 1000 mg IV ordered.   at bedside denies any history of seizures. Jovon Gill MD 06/13 1022   ECG 12 Lead 12-lead EKG performed at 2037 hrs.  Interpreted by  myself at 2038 hrs. normal sinus rhythm.  Ventricular rate 82.NH interval 128.  QRS duration 156.  .  No pathologic blocks.  Right bundle branch block.  No sustained dysrhythmia.  No acute ischemic ST segment deviation. Jovon Zhou MD 06/13 8698    D/W Dr Chinchilla, accepting for transfer.  Will be later this morning before a bed is available Jovon Zhou MD 06/14 0320     CT Head Without Contrast   ED Interpretation   Add without IV contrast   Faxed report from [facility]   Impression: Cerebral atrophy, chronic small has a little ischemic   disease.   Signed Hipolito Mitchell M.D.      Final Result   Stable negative noncontrasted cranial CT.        662.8 mGy.cm   The radiation dose reduction device was utilized for each scan per the   ALARA (as low as reasonably achievable) protocol.       This report was finalized on 6/14/2017 9:14 AM by Dr. Nawaf Escalante II, MD.          XR Chest 1 View   ED Interpretation   Single portable AP chest   My read   No apparent acute infiltrate or injury.      Final Result   No evidence of active disease in the chest       This report was finalized on 6/14/2017 8:12 AM by Dr. Nawaf Escalante II, MD.            Labs Reviewed   COMPREHENSIVE METABOLIC PANEL - Abnormal; Notable for the following:        Result Value    Glucose 117 (*)     A/G Ratio 1.0 (*)     All other components within normal limits    Narrative:     The MDRD GFR formula is only valid for adults with stable renal function between ages 18 and 70.   APTT - Abnormal; Notable for the following:     PTT 24.3 (*)     All other components within normal limits    Narrative:     Heparin protocol:    Therapeutic range 56-80 seconds   URINALYSIS W/ CULTURE IF INDICATED - Abnormal; Notable for the following:     Color, UA Dark Yellow (*)     Ketones, UA 15 mg/dL (1+) (*)     All other components within normal limits    Narrative:     Urine microscopic not indicated.   MYOGLOBIN, SERUM - Abnormal;  Notable for the following:     Myoglobin 135.0 (*)     All other components within normal limits   DIGOXIN LEVEL - Abnormal; Notable for the following:     Digoxin 0.10 (*)     All other components within normal limits   BLOOD GAS, ARTERIAL - Abnormal; Notable for the following:     pO2, Arterial 64.2 (*)     Hematocrit, Blood Gas 36.0 (*)     All other components within normal limits   CBC WITH AUTO DIFFERENTIAL - Abnormal; Notable for the following:     MCHC 32.0 (*)     RDW 14.9 (*)     All other components within normal limits   CELL COUNT CSF - Abnormal; Notable for the following:     RBC, CSF 29 (*)     All other components within normal limits    Narrative:     Differential not indicated.   BLOOD CULTURE - Normal   BLOOD CULTURE - Normal   CULTURE, CSF - Normal   ANAEROBIC CULTURE - Normal   PROTIME-INR - Normal    Narrative:     Patients not on anticoagulant therapy:    INR 0.90-1.10     Suggested INR therapeutic range for stable oral anticoagulant therapy:             Routine therapy                      2.00-3.00           Recurrent MI                         2.50-3.50           Mechanical prosthetic valve          2.50-3.50   CK - Normal   CK MB - Normal   TROPONIN (IN-HOUSE) - Normal    Narrative:     Ultra Troponin I Reference Range:         <=0.039 ng/mL: Negative    0.04-0.779 ng/mL: Indeterminate Range. Suspicious of MI.  Clinical correlation required.       >=0.78  ng/mL: Consistent with myocardial injury.  Clinical correlation required.   LACTIC ACID, PLASMA - Normal   MAGNESIUM - Normal   OSMOLALITY, CALCULATED - Normal   CKMB INDEX CALCULATION - Normal   GLUCOSE, CSF - Normal   PROTEIN, CSF - Normal   AFB CULTURE   HSV 1/2, PCR   FUNGUS CULTURE   OLIGOCLONAL BANDING    Narrative:     Performed at:  [address] [place] [address]  [id number]  : Denis aFrmer PhD, Phone:  [phone]   CELL COUNT CSF    Narrative:     Differential not indicated.   CRYPTOCOCCAL  ANTIGEN, TITER, CSF   VDRL, CSF   NON-GYNECOLOGIC CYTOLOGY   CBC AND DIFFERENTIAL    Narrative:     The following orders were created for panel order CBC & Differential.  Procedure                               Abnormality         Status                     ---------                               -----------         ------                     CBC Auto Differential[[id number]]        Abnormal            Final result                 Please view results for these tests on the individual orders.   CELL COUNT WITH DIFFERENTIAL, CSF    Narrative:     The following orders were created for panel order Cell Count With Differential, CSF Use tube: 1.  Procedure                               Abnormality         Status                     ---------                               -----------         ------                     Cell Count, CSF[[id number]]              Abnormal            Final result                 Please view results for these tests on the individual orders.   CELL COUNT WITH DIFFERENTIAL, CSF    Narrative:     The following orders were created for panel order Cell Count With Differential, CSF Use tube: 4.  Procedure                               Abnormality         Status                     ---------                               -----------         ------                     Cell Count, CSF[[id number]]                                  Final result                 Please view results for these tests on the individual orders.        Medication List      ASK your doctor about these medications          albuterol 108 (90 BASE) MCG/ACT inhaler   Commonly known as:  PROVENTIL HFA;VENTOLIN HFA                       MDM  Number of Diagnoses or Management Options  New onset seizure: new and requires workup     Amount and/or Complexity of Data Reviewed  Clinical lab tests: ordered and reviewed  Tests in the radiology section of CPT®: ordered and reviewed  Obtain history from someone other than the patient: yes  Discuss the  patient with other providers: yes  Independent visualization of images, tracings, or specimens: yes    Risk of Complications, Morbidity, and/or Mortality  Presenting problems: high  Diagnostic procedures: high  Management options: high    Patient Progress  Patient progress: stable      Final diagnoses:   New onset seizure            Jovon Zhou MD  06/19/17 0595

## 2017-06-14 NOTE — H&P
[facility] Medicine Services  HISTORY AND PHYSICAL    Primary Care Physician: LAMONT Keen    Subjective     Chief Complaint: Seizures    History of Present Illness:   Ms. Barber is a 73yo female with a history of COPD, CAD with prior MI and ischemic cardiomyopathy (last known EF 27% in 2016). She was transferred to [facility] from [facility] following witnessed tonic-clonic seizure. The patient does not recall all events of yesterday evening and no family is present at the bedside so some history is obtained from the patient chart.     Ms. Barber does report that she has not been feeling well lately and has been feeling increasingly short of breath, particularly with activity. She has been using her albuterol inhaler but it helps very little. She endorses feeling warm/subjective fevers without chills or diaphoresis. Her cough has been non-productive. She denies orthopnea or paroxsymal nocturnal dyspnea. She is unaware of any personal history of heart failure. She does not recall collapsing last night but was reportedly found unresponsive by family who left her alone for approximately 10 minutes before finding her. EMS reported that she was minimally responsive on arrival and ED notes describe the patient as confused during her stay in the ED. She denies new illness or trauma. She has had one seizure in the past after taking a sleeping medication (Talwin) but has never needed to take anticonvulsants.     Around 2315 yesterday night, patient had a witnessed tonic-clonic seizure in the ED and was loaded with Keppra 1000mg IV. Decision was made to transfer to [facility]. Upon exam, patient is alert and oriented. She does not recall the episode at home and recalls dreaming about her  parents while in the ED.     Evaluation thus far shows elevated CPK (288), CBC and BNP unremarkable, UA with 3+ blood, 1+ leuk esterase, negative nitrite and no bacteria  seen.    Review of Systems   Constitutional: Positive for activity change and fever. Negative for chills and fatigue.   HENT: Negative.    Respiratory: Positive for cough and shortness of breath. Negative for wheezing.    Cardiovascular: Negative for chest pain, palpitations and leg swelling.   Gastrointestinal: Negative for abdominal pain, constipation, diarrhea, nausea and vomiting.   Genitourinary: Negative for dysuria.   Musculoskeletal: Negative.    Skin: Negative.    Neurological: Negative for tremors, syncope, weakness and headaches.   Psychiatric/Behavioral: Negative.       Otherwise complete ROS performed and negative except as mentioned in the HPI.    Past Medical History:   Diagnosis Date   • Asthma    • COPD (chronic obstructive pulmonary disease)    • Coronary artery disease    • Coronary artery disease    • Ischemic cardiomyopathy    • Myocardial infarction      Past Surgical History:   Procedure Laterality Date   • APPENDECTOMY     • HIP SURGERY Left    • TUBAL ABDOMINAL LIGATION       Family History   Problem Relation Age of Onset   • Heart disease Mother    • COPD Mother    • Stroke Father    • Diabetes Brother    • Heart disease Brother      Social History     Social History   • Marital status:      Spouse name: N/A   • Number of children: N/A   • Years of education: N/A     Occupational History   • Not on file.     Social History Main Topics   • Smoking status: Former Smoker   • Smokeless tobacco: Not on file   • Alcohol use No   • Drug use: No   • Sexual activity: Not on file     Other Topics Concern   • Not on file     Social History Narrative     Allergies   Allergen Reactions   • Codeine    • Penicillins    • Talwin [Pentazocine]      Objective     Physical Exam   Temp:  [98.1 °F (36.7 °C)-98.4 °F (36.9 °C)] 98.4 °F (36.9 °C)  Heart Rate:  [78-88] 88  Resp:  [16-20] 16  BP: (135-177)/(52-90) 147/70     Constitutional: no acute distress, awake, alert, drowsy  Eyes: PERRLA, sclerae  anicteric, no conjunctival injection  Neck: supple, no thyromegaly, trachea midline  Respiratory: Diffuse inspiratory/expiratory rhonchi, no wheezing or rales nonlabored respirations   Cardiovascular: RRR, no murmurs, rubs, or gallops, palpable pedal pulses bilaterally  Gastrointestinal: Positive bowel sounds, soft, nontender, nondistended  Musculoskeletal: No bilateral ankle edema, no clubbing or cyanosis to bilateral lower extremities  Psychiatric: oriented x 3, appropriate affect, cooperative  Neurologic: Strength symmetric in all extremities, Cranial Nerves grossly intact to confrontation     Lab Results (last 24 hours)     Procedure Component Value Units Date/Time    Urinalysis With / Microscopic If Indicated [[id number]]  (Abnormal) Collected:  06/14/17 1117    Specimen:  Urine from Urine, Catheter Updated:  06/14/17 1133     Color, UA Yellow     Appearance, UA Clear     pH, UA 5.5     Specific Gravity, UA 1.018     Glucose, UA Negative     Ketones, UA 15 mg/dL (1+) (A)     Bilirubin, UA Negative     Blood, UA Large (3+) (A)     Protein, UA Trace (A)     Leuk Esterase, UA Small (1+) (A)     Nitrite, UA Negative     Urobilinogen, UA 1.0 E.U./dL    Urinalysis, Microscopic Only [[id number]]  (Abnormal) Collected:  06/14/17 1117    Specimen:  Urine from Urine, Catheter Updated:  06/14/17 1133     RBC, UA Too Numerous to Count (A) /HPF      WBC, UA 3-5 (A) /HPF      Bacteria, UA None Seen /HPF      Squamous Epithelial Cells, UA 0-2 /HPF      Hyaline Casts, UA 0-6 /LPF      Methodology Automated Microscopy    CBC (No Diff) [[id number]]  (Abnormal) Collected:  06/14/17 1205    Specimen:  Blood Updated:  06/14/17 1216     WBC 8.67 10*3/mm3      RBC 4.20 10*6/mm3      Hemoglobin 11.9 g/dL      Hematocrit 37.8 %      MCV 90.0 fL      MCH 28.3 pg      MCHC 31.5 (L) g/dL      RDW 15.0 (H) %      RDW-SD 49.5 fl      MPV 8.8 fL      Platelets 206 10*3/mm3     Basic Metabolic Panel [[id number]]  (Abnormal) Collected:  06/14/17  1205    Specimen:  Blood Updated:  06/14/17 1245     Glucose 76 mg/dL      BUN 11 mg/dL      Creatinine 0.50 (L) mg/dL      Sodium 135 mmol/L      Potassium 4.0 mmol/L      Chloride 102 mmol/L      CO2 28.0 mmol/L      Calcium 9.1 mg/dL      eGFR Non African Amer 121 mL/min/1.73      BUN/Creatinine Ratio 22.0     Anion Gap 5.0 mmol/L     Magnesium [[id number]]  (Normal) Collected:  06/14/17 1205    Specimen:  Blood Updated:  06/14/17 1245     Magnesium 2.0 mg/dL     CK [[id number]]  (Abnormal) Collected:  06/14/17 1205    Specimen:  Blood Updated:  06/14/17 1245     Creatine Kinase 288 (H) U/L     Troponin [[id number]]  (Normal) Collected:  06/14/17 1205    Specimen:  Blood Updated:  06/14/17 1252     Troponin I <0.006 ng/mL         I have personally reviewed and interpreted available lab data, radiology studies and ECG obtained at time of admission.     Assessment / Plan     Principal Problem:    New onset seizure  Active Problems:    Pyuria    Ischemic cardiomyopathy    Chronic bronchitis    New-onset seizure activity  - EEG, MRI brain, Keppro 500mg PO BID  - Dr. Cunha discussed case with Dr. Sandhu who will see the patient  - Neuro checks  - UA, UDS  - Labs WNL     Pyuria  - Will treat as UTI with rocephin, follow cultures with low threshold to stop antibiotics    Ischemic cardiomyopathy  - History of prior MI, uncertain of stenting history   - Will check Echocardiogram, if EF still < 30%, will need cardiology consultation to get LifeVest arranged prior to d/c   - Await echo but regimen will likely require optimization with ASA, BB, ACEI, etc.     Chronic bronchitis, acute exacerbation  - Currently hypoxic with coarse rhonchi on exam  - No active chest disease on CXR yesterday  - Will treat as COPD exacerbation with PO Doxy, O2 PRN, scheduled nebs     DVT prophylaxis: Lovenox  Code Status: FULL   Admission Status: Patient will be admitted to BENNETT Mckeon PA-C 06/14/17 11:41 AM          Brief  "Attending Note       I have seen and examined the patient, performing an independent face-to-face diagnostic evaluation with plan of care reviewed and developed with the advanced practice clinician (APC).      Brief Summary Statement/HPI:   71 yo f w/ hx asthma, cad, compensated systolic hf (per echo of October 2016 at outside facility), anxiety (on konopin), chronic pain, and dementia who was in her usual state of health until yesterday when developed sudden \"clenching\" of her arms, turned head to the side, grunting which lasted about 5 minutes at which time EMS showed up. Confused but gradually improved responsiveness. No syncope. No antecedent chest pain or fall. No new medicines and has been compliant with her scheduled twice daily klonopin, no etoh use. At outside hospital had another witnessed tonic-clonic seizure. Got ativan, keppra 1000 mg iv. Had negative head ct and negative lumbar puncture. No recent falls. No fevers. Since first \"spell\", wheezing a little more than usual. Quite smoking many years ago. [facility] specifically [facility]ic consultation.    Of note, in review of outside records had an echo performed October 2016 which showed ER 25% w/ moderate mitral valve regurgitation. Patient and family deny knowledge of any chf, no b-blocker usage or ace-inhibitor usage but denies any adverse reactions to those medicines either. Has not seen cardilogist \"in years\".    Attending Physical Exam:  Alert, oriented to year, wrong month. Answers simple questions appropriately  [place], oroph clear  rrr  Expiratory wheezes and coarse rhonchi, normal effort  abd soft, nontender  No cce  No extremity rash  Equal  and leg raise, face symmetric, speech clear  Normal affect    Brief Assessment/Plan :    *Seizures   -negative ct head at outside facility   -negative LP at outside facility   -s/p keppra 1000mg iv and ativan at outside ER  *COPD w/ acute " exacerbation  *Pyuria  *Dementia  *Hx CAD   -data deficit  *Hx compensated SHF (EF 10/2016 noted to have EF 25%, moderate MR)  *Anxiety, chronic BZD use  *Chronic pain    Plan:  -EEG  -MR brain  Neuro checks  -Neuro consult  -Continue keppra (at 500mg bid for now)  Solumedrol, scheduled and prn nebs (wean steroids as tolerates)  Empiric rocephin & doxy  (follow urine culture; also for bronchitis)  -Cbc, bmp, lipid panel in a.m.  -Echo (if EF depressed, trial low dose b-blocker/ace-inh; if ef <30 consider cards consultation for ? Event monitor, etc)      See above for further detailed assessment and plan developed with APC which I have reviewed and/or edited.    I believe this patient meets inpatient status for workup for multiple seizures, and treatment of copd exacerbation    Westley Cunha MD  06/14/17  2:36 PM

## 2017-06-14 NOTE — CONSULTS
"    Referring Provider: Dr Cunha  Reason for Consultation: seizures    Patient Care Team:  LAMONT Brewer as PCP - General (Family Medicine)  LAMONT Alicia as PCP - Claims Attributed    Chief complaint seizures    Subjective .     History of present illness:  71 yo RH woman with COPD, CAD and CM, in usual recent health yesterday (frequently feels sob, did so yesterday afternoon and used nebulizer), was on her phone about 5pm, when  witnessed sudden onset of body stiffening, jerking, similar to grand mal seizures that great grandson has. This lasted couple minutes, called EMS, and in ER in [place], had two more seizures, one where dtr notes it started with unresponsiveness to questions, was looking around room, not answering, then head turn to right, bilateral arm flexion, limbs stiff and rhythmic jerking, irregular breathing. Given ativan, and had one more, slightly less violent convulsion a while later, this time with vigorous facial movement immediately prior to limb stiffness and jerking. Pt recall none of this, but remembers earlier yesterday and events of today.  No further seizures noted after IV load of Keppra.  No injury per pt.   Has had problems with memory for \"a while.\" Not constant, but forgets events, misplaces objects, etc. Had scan and told OK. Normal TSH 5/23/17, but recently low.  No previous h/o sz, no h/o stroke, only family hx is great grandson.  No recent fevers, but \"warm\" at times. Has nonproductive cough. Breathing feels OK at the moment.    Review of Systems  Pertinent items are noted in HPI, all other systems reviewed and negative    History  Past Medical History:   Diagnosis Date   • Asthma    • COPD (chronic obstructive pulmonary disease)    • Coronary artery disease    • Coronary artery disease    • Ischemic cardiomyopathy    • Myocardial infarction    ,   Past Surgical History:   Procedure Laterality Date   • APPENDECTOMY     • HIP SURGERY Left    • TUBAL ABDOMINAL " LIGATION     ,   Family History   Problem Relation Age of Onset   • Heart disease Mother    • COPD Mother    • Stroke Father    • Diabetes Brother    • Heart disease Brother    ,   Social History   Substance Use Topics   • Smoking status: Former Smoker   • Smokeless tobacco: Not on file   • Alcohol use No   ,   Prescriptions Prior to Admission   Medication Sig Dispense Refill Last Dose   • clonazePAM (KlonoPIN) 0.5 MG tablet Take 0.5 mg by mouth 2 (Two) Times a Day.      • HYDROcodone-acetaminophen (NORCO) 5-325 MG per tablet Take 1 tablet by mouth 2 (Two) Times a Day.      • hydrOXYzine (VISTARIL) 25 MG capsule Take 25 mg by mouth 3 (Three) Times a Day As Needed for Itching.      • rOPINIRole (REQUIP) 1 MG tablet Take 1 mg by mouth Every Night. Take 1 hour before bedtime.      • traMADol (ULTRAM) 50 MG tablet Take 50 mg by mouth Every 8 (Eight) Hours As Needed.      • albuterol (PROVENTIL HFA;VENTOLIN HFA) 108 (90 BASE) MCG/ACT inhaler Inhale 2 puffs every 4 (four) hours as needed for wheezing.   10/16/2016 at 0600   • nortriptyline (PAMELOR) 50 MG capsule Take 25 mg by mouth At Night As Needed.   10/15/2016 at 2200   , Scheduled Meds:    budesonide 0.5 mg Nebulization BID - RT   ceftriaxone 1 g Intravenous Q24H   clonazePAM 0.5 mg Oral BID   doxycycline 100 mg Oral Q12H   enoxaparin 40 mg Subcutaneous Daily   famotidine 40 mg Oral Daily   guaiFENesin 600 mg Oral Q12H   ipratropium-albuterol 3 mL Nebulization 4x Daily - RT   levETIRAcetam 500 mg Oral Q12H   methylPREDNISolone sodium succinate 40 mg Intravenous Q12H   sennosides-docusate sodium 2 tablet Oral BID   , Continuous Infusions:   , PRN Meds:  •  acetaminophen  •  ipratropium-albuterol  •  sodium chloride and Allergies:  Codeine; Penicillins; and Talwin [pentazocine]    Objective     Vital Signs   Blood pressure 147/70, pulse 88, temperature 98.4 °F (36.9 °C), temperature source Oral, resp. rate 16, SpO2 97 %.    Physical Exam:   Well developed elderly ww  in nad. Alert, fully oriented, non aphasic, normal paul, attention minimally diminished, impaired STM for recent events.  VFF EOMI pupils 2.5mm and reactive, difficult optic fundus exam, but no papilledema appreciated. Normal facial sensation, movement. Normal hearing, and symmetric elevation shoulders and palate, TML  Motor:5/5 no drift  Normal tone and coordination upper and lower extremities.  LT, vibration intact  Pt reported high fall risk, will hold off gait eval until seen by PT.  Neck supple, no carotid bruit appreciated  Heart RRR no murmur  Abdomen soft, nt, nd        Results Review:   I reviewed the patient's new clinical results.  I reviewed the patient's new imaging results and agree with the interpretation.  I reviewed the patient's other test results and agree with the interpretation  Head CT personally reviewed, agree benign  Labs reviewed -- CSF normal to date, wcc 8.6, , UA small LE, large blood, +rbc    Assessment/Plan     New onset seizures -- events all within 24 hours, risk of recurrence unclear. EEG is pending, head CT unremarkable. Will get MRI to fine tune prognosis.  Etiology unclear, but likely to be idiopathic, and no concern for CNS infection given exam and CSF results. Given recent reported memory problems, concern for previously unrecognized subclinical seizures. Would be inclined to continue LVT for now even if MRI and EEG benign, but will follow.     I discussed the patients findings and my recommendations with patient, family and primary care team    Lou Sandhu MD  06/14/17  3:00 PM

## 2017-06-15 ENCOUNTER — APPOINTMENT (OUTPATIENT)
Dept: CARDIOLOGY | Facility: HOSPITAL | Age: 72
End: 2017-06-15

## 2017-06-15 VITALS
TEMPERATURE: 97.5 F | DIASTOLIC BLOOD PRESSURE: 52 MMHG | HEART RATE: 81 BPM | SYSTOLIC BLOOD PRESSURE: 126 MMHG | BODY MASS INDEX: 22.02 KG/M2 | RESPIRATION RATE: 20 BRPM | WEIGHT: 129 LBS | HEIGHT: 64 IN | OXYGEN SATURATION: 96 %

## 2017-06-15 PROBLEM — R82.81 PYURIA: Status: RESOLVED | Noted: 2017-06-14 | Resolved: 2017-06-15

## 2017-06-15 PROBLEM — R56.9 SEIZURE (HCC): Status: RESOLVED | Noted: 2017-06-14 | Resolved: 2017-06-15

## 2017-06-15 LAB
ANION GAP SERPL CALCULATED.3IONS-SCNC: 4 MMOL/L (ref 3–11)
ARTICHOKE IGE QN: 91 MG/DL (ref 0–130)
BASOPHILS # BLD AUTO: 0.01 10*3/MM3 (ref 0–0.2)
BASOPHILS NFR BLD AUTO: 0.2 % (ref 0–1)
BUN BLD-MCNC: 14 MG/DL (ref 9–23)
BUN/CREAT SERPL: 28 (ref 7–25)
CALCIUM SPEC-SCNC: 9.2 MG/DL (ref 8.7–10.4)
CHLORIDE SERPL-SCNC: 100 MMOL/L (ref 99–109)
CHOLEST SERPL-MCNC: 184 MG/DL (ref 0–200)
CO2 SERPL-SCNC: 29 MMOL/L (ref 20–31)
CREAT BLD-MCNC: 0.5 MG/DL (ref 0.6–1.3)
DEPRECATED RDW RBC AUTO: 45.9 FL (ref 37–54)
EOSINOPHIL # BLD AUTO: 0 10*3/MM3 (ref 0.1–0.3)
EOSINOPHIL NFR BLD AUTO: 0 % (ref 0–3)
ERYTHROCYTE [DISTWIDTH] IN BLOOD BY AUTOMATED COUNT: 14.5 % (ref 11.3–14.5)
GFR SERPL CREATININE-BSD FRML MDRD: 121 ML/MIN/1.73
GLUCOSE BLD-MCNC: 97 MG/DL (ref 70–100)
HBA1C MFR BLD: 5.3 % (ref 4.8–5.6)
HCT VFR BLD AUTO: 37.1 % (ref 34.5–44)
HDLC SERPL-MCNC: 77 MG/DL (ref 40–60)
HGB BLD-MCNC: 11.9 G/DL (ref 11.5–15.5)
IMM GRANULOCYTES # BLD: 0.02 10*3/MM3 (ref 0–0.03)
IMM GRANULOCYTES NFR BLD: 0.3 % (ref 0–0.6)
LAB AP CASE REPORT: NORMAL
LYMPHOCYTES # BLD AUTO: 0.89 10*3/MM3 (ref 0.6–4.8)
LYMPHOCYTES NFR BLD AUTO: 13.8 % (ref 24–44)
Lab: NORMAL
MCH RBC QN AUTO: 27.9 PG (ref 27–31)
MCHC RBC AUTO-ENTMCNC: 32.1 G/DL (ref 32–36)
MCV RBC AUTO: 86.9 FL (ref 80–99)
MONOCYTES # BLD AUTO: 0.06 10*3/MM3 (ref 0–1)
MONOCYTES NFR BLD AUTO: 0.9 % (ref 0–12)
NEUTROPHILS # BLD AUTO: 5.47 10*3/MM3 (ref 1.5–8.3)
NEUTROPHILS NFR BLD AUTO: 84.8 % (ref 41–71)
PLATELET # BLD AUTO: 227 10*3/MM3 (ref 150–450)
PMV BLD AUTO: 9.3 FL (ref 6–12)
POTASSIUM BLD-SCNC: 4 MMOL/L (ref 3.5–5.5)
RBC # BLD AUTO: 4.27 10*6/MM3 (ref 3.89–5.14)
SODIUM BLD-SCNC: 133 MMOL/L (ref 132–146)
TRIGL SERPL-MCNC: 67 MG/DL (ref 0–150)
WBC NRBC COR # BLD: 6.45 10*3/MM3 (ref 3.5–10.8)

## 2017-06-15 PROCEDURE — 94799 UNLISTED PULMONARY SVC/PX: CPT

## 2017-06-15 PROCEDURE — 94760 N-INVAS EAR/PLS OXIMETRY 1: CPT

## 2017-06-15 PROCEDURE — 25010000002 ENOXAPARIN PER 10 MG: Performed by: PHYSICIAN ASSISTANT

## 2017-06-15 PROCEDURE — 83036 HEMOGLOBIN GLYCOSYLATED A1C: CPT | Performed by: PHYSICIAN ASSISTANT

## 2017-06-15 PROCEDURE — 85025 COMPLETE CBC W/AUTO DIFF WBC: CPT | Performed by: PHYSICIAN ASSISTANT

## 2017-06-15 PROCEDURE — 80061 LIPID PANEL: CPT | Performed by: PHYSICIAN ASSISTANT

## 2017-06-15 PROCEDURE — 80048 BASIC METABOLIC PNL TOTAL CA: CPT | Performed by: PHYSICIAN ASSISTANT

## 2017-06-15 PROCEDURE — 25010000003 CEFTRIAXONE PER 250 MG: Performed by: PHYSICIAN ASSISTANT

## 2017-06-15 PROCEDURE — 25010000002 METHYLPREDNISOLONE PER 40 MG: Performed by: INTERNAL MEDICINE

## 2017-06-15 PROCEDURE — 99232 SBSQ HOSP IP/OBS MODERATE 35: CPT | Performed by: PSYCHIATRY & NEUROLOGY

## 2017-06-15 PROCEDURE — 99239 HOSP IP/OBS DSCHRG MGMT >30: CPT | Performed by: NURSE PRACTITIONER

## 2017-06-15 RX ORDER — DOXYCYCLINE HYCLATE 100 MG/1
100 CAPSULE ORAL EVERY 12 HOURS
Qty: 12 CAPSULE | Refills: 0 | Status: SHIPPED | OUTPATIENT
Start: 2017-06-15 | End: 2017-06-21

## 2017-06-15 RX ORDER — PREDNISONE 10 MG/1
TABLET ORAL
Qty: 24 TABLET | Refills: 0 | Status: SHIPPED | OUTPATIENT
Start: 2017-06-16 | End: 2018-04-19

## 2017-06-15 RX ORDER — NAPROXEN 500 MG/1
500 TABLET ORAL 2 TIMES DAILY PRN
COMMUNITY
End: 2017-06-15 | Stop reason: HOSPADM

## 2017-06-15 RX ORDER — BUDESONIDE 0.5 MG/2ML
0.5 INHALANT ORAL
COMMUNITY

## 2017-06-15 RX ORDER — NORTRIPTYLINE HYDROCHLORIDE 25 MG/1
50 CAPSULE ORAL NIGHTLY
COMMUNITY

## 2017-06-15 RX ORDER — LEVETIRACETAM 500 MG/1
500 TABLET ORAL EVERY 12 HOURS
Qty: 60 TABLET | Refills: 0 | Status: SHIPPED | OUTPATIENT
Start: 2017-06-15 | End: 2017-08-22

## 2017-06-15 RX ORDER — CITALOPRAM 20 MG/1
20 TABLET ORAL DAILY
COMMUNITY
End: 2017-06-15 | Stop reason: HOSPADM

## 2017-06-15 RX ADMIN — CLONAZEPAM 0.5 MG: 0.5 TABLET ORAL at 09:06

## 2017-06-15 RX ADMIN — HYDROCODONE BITARTRATE AND ACETAMINOPHEN 1 TABLET: 5; 325 TABLET ORAL at 14:54

## 2017-06-15 RX ADMIN — IPRATROPIUM BROMIDE AND ALBUTEROL SULFATE 3 ML: .5; 3 SOLUTION RESPIRATORY (INHALATION) at 07:58

## 2017-06-15 RX ADMIN — IPRATROPIUM BROMIDE AND ALBUTEROL SULFATE 3 ML: .5; 3 SOLUTION RESPIRATORY (INHALATION) at 12:37

## 2017-06-15 RX ADMIN — DOXYCYCLINE HYCLATE 100 MG: 100 CAPSULE, GELATIN COATED ORAL at 05:22

## 2017-06-15 RX ADMIN — ENOXAPARIN SODIUM 40 MG: 40 INJECTION SUBCUTANEOUS at 09:07

## 2017-06-15 RX ADMIN — METHYLPREDNISOLONE SODIUM SUCCINATE 40 MG: 40 INJECTION, POWDER, LYOPHILIZED, FOR SOLUTION INTRAMUSCULAR; INTRAVENOUS at 13:47

## 2017-06-15 RX ADMIN — LEVETIRACETAM 500 MG: 500 TABLET, FILM COATED ORAL at 05:22

## 2017-06-15 RX ADMIN — FAMOTIDINE 40 MG: 20 TABLET, FILM COATED ORAL at 09:06

## 2017-06-15 RX ADMIN — METHYLPREDNISOLONE SODIUM SUCCINATE 40 MG: 40 INJECTION, POWDER, LYOPHILIZED, FOR SOLUTION INTRAMUSCULAR; INTRAVENOUS at 02:50

## 2017-06-15 RX ADMIN — CEFTRIAXONE SODIUM 1 G: 1 INJECTION, SOLUTION INTRAVENOUS at 09:06

## 2017-06-15 RX ADMIN — BUDESONIDE 0.5 MG: 0.5 INHALANT RESPIRATORY (INHALATION) at 07:58

## 2017-06-15 RX ADMIN — GUAIFENESIN 600 MG: 600 TABLET, EXTENDED RELEASE ORAL at 05:22

## 2017-06-15 RX ADMIN — DOCUSATE SODIUM AND SENNOSIDES 2 TABLET: 8.6; 5 TABLET, FILM COATED ORAL at 09:06

## 2017-06-15 NOTE — PLAN OF CARE
Problem: Patient Care Overview (Adult)  Goal: Plan of Care Review  Outcome: Outcome(s) achieved Date Met:  06/15/17  Goal: Adult Individualization and Mutuality  Outcome: Outcome(s) achieved Date Met:  06/15/17  Goal: Discharge Needs Assessment  Outcome: Outcome(s) achieved Date Met:  06/15/17    Problem: Respiratory Insufficiency (Adult)  Goal: Identify Related Risk Factors and Signs and Symptoms  Outcome: Outcome(s) achieved Date Met:  06/15/17  Goal: Acid/Base Balance  Outcome: Outcome(s) achieved Date Met:  06/15/17  Goal: Effective Ventilation  Outcome: Outcome(s) achieved Date Met:  06/15/17    Problem: Seizure Disorder/Epilepsy (Adult)  Goal: Signs and Symptoms of Listed Potential Problems Will be Absent or Manageable (Seizure Disorder/Epilepsy)  Outcome: Outcome(s) achieved Date Met:  06/15/17

## 2017-06-15 NOTE — PROGRESS NOTES
"Adult Nutrition  Assessment/PES    Patient Name:  Nicole Barber  YOB: 1945  MRN: [id number]  Admit Date:  6/14/2017    Assessment Date:  6/15/2017        Reason for Assessment       06/15/17 1535    Reason for Assessment    Neurological Dementia;Seizure   new onset seizure      06/15/17 1530    Reason for Assessment    Reason For Assessment/Visit admission assessment    Identified At Risk By Screening Criteria other (see comments)   clarification of \"unsure\" as r/t wt change prior to adm.    Time Spent (min) 20    PT states she intentionally lost 100lb from Feb 2016 to Feb 2017, and that wt has been stable at 129-130lb x past 3-4 months. Based on this wt report, pt does not meet criteria for nutrition risk at this time. No other family in room to discuss pt's wt.                Anthropometrics       06/15/17 1534    Anthropometrics    Height 162.6 cm (64\")    Weight 58.5 kg (129 lb)    Ideal Body Weight (IBW)    Ideal Body Weight (IBW), Female 55.4    % Ideal Body Weight 105.83    Body Mass Index (BMI)    BMI (kg/m2) 22.19                  Nutrition Prescription Ordered       06/15/17 1534    Nutrition Prescription PO    Current PO Diet Regular    Fluid Consistency Thin    Common Modifiers Cardiac;Consistent Carbohydrate            Evaluation of Received Nutrient/Fluid Intake       06/15/17 1534    PO Evaluation    Number of Days PO Intake Evaluated Insufficient Data              Problem/Interventions:        Problem 1       06/15/17 1534    Nutrition Diagnoses Problem 1    Problem 1 No Nutrition Diagnosis at this Time                    Intervention Goal       06/15/17 1534    Intervention Goal    PO Establish PO            Nutrition Intervention       06/15/17 1534    Nutrition Intervention    RD/Tech Action Follow Tx progress              Education/Evaluation       06/15/17 1533    Monitor/Evaluation    Monitor Per protocol      06/15/17 1532    Monitor/Evaluation    Monitor Per protocol    "     Comments:      Electronically signed by:  Selma Quach MS,RD,LD  06/15/17 3:36 PM

## 2017-06-15 NOTE — PLAN OF CARE
Problem: Seizure Disorder/Epilepsy (Adult)  Goal: Signs and Symptoms of Listed Potential Problems Will be Absent or Manageable (Seizure Disorder/Epilepsy)  Outcome: Ongoing (interventions implemented as appropriate)    06/14/17 0950   Seizure Disorder/Epilepsy   Problems Assessed (Seizure Disorder/Epilepsy) all   Problems Present (Seizure Disorder/Epilepsy) situational response

## 2017-06-15 NOTE — PROGRESS NOTES
Discharge Planning Assessment  [facility]     Patient Name: Nicole Barber  MRN: [id number]  Today's Date: 6/15/2017    Admit Date: 6/14/2017          Discharge Needs Assessment       06/15/17 1003    Living Environment    Lives With spouse;child(alexia), adult;child(alexia), dependent   lives with , grandson and great grandson    Living Arrangements house    Home Accessibility no concerns;bed and bath on same level;stairs to enter home    Number of Stairs to Enter Home 3    Stair Railings at Home none    Transportation Available car;family or friend will provide    Living Environment    Provides Primary Care For no one    Quality Of Family Relationships supportive;helpful    Able to Return to Prior Living Arrangements yes    Discharge Needs Assessment    Concerns To Be Addressed no discharge needs identified    Readmission Within The Last 30 Days no previous admission in last 30 days    Anticipated Changes Related to Illness none    Equipment Currently Used at Home none    Equipment Needed After Discharge none    Discharge Disposition home or self-care    Discharge Contact Information if Applicable self or  [place] [phone]            Discharge Plan       06/15/17 1006    Case Management/Social Work Plan    Plan home    Patient/Family In Agreement With Plan yes    Additional Comments Met with patient at bedside to initiate discharge planning. Patient currently lives with her , grandson and great-grandson in a one level home in [facility]. Patient is independent with ADLs and denies the use of DME or HH. Her goal is to return home at discharge, family can transport via car. She denies needs at this time. ASHLEY will follow. SOPHY Gagnon CM [phone]        Discharge Placement     No information found        Expected Discharge Date and Time     Expected Discharge Date Expected Discharge Time    Jun 17, 2017               Demographic Summary       06/15/17 1002    Referral Information    Arrived From home or  self-care    Referral Source admission list    Reason For Consult discharge planning    Record Reviewed history and physical;medical record    Contact Information    Permission Granted to Share Information With ;family/designee    Comments  [place] [phone]    Primary Care Physician Information    Name Britt Brown            Functional Status       06/15/17 1003    Functional Status Prior    Ambulation 0-->independent    Transferring 0-->independent    Toileting 0-->independent    Bathing 0-->independent    Dressing 0-->independent    Eating 0-->independent    Communication 0-->understands/communicates without difficulty    IADL    Medications independent    Meal Preparation independent    Housekeeping independent    Laundry independent    Shopping independent    Oral Care independent    Employment/Financial    Employment/Finance Comments Verified patient's insurance as Medicare A/B and [facility]. Patient has Rx coverage and can afford medications.             Psychosocial     None            Abuse/Neglect     None            Legal     None            Substance Abuse     None            Patient Forms     None          Helena Gagnon RN

## 2017-06-15 NOTE — DISCHARGE INSTR - APPOINTMENTS
You have an appointment with LAMONT Keen on June 19, 2017@ 10:00 AM.   Call them if you have any questions. Phone: [phone]  [address] [facility] [address] [id number]    LAMONT Keen office will notify you when your Echocardiogram appt. Will be scheduled.

## 2017-06-15 NOTE — DISCHARGE SUMMARY
[facility] Medicine Services  DISCHARGE SUMMARY       Date of Admission: 6/14/2017  Date of Discharge:  6/15/2017  Primary Care Physician: LAMONT Keen  Consulting Physician(s)     Provider Relationship    Lou Sandhu MD Consulting Physician        Discharge Diagnoses:  Active Hospital Problems (** Indicates Principal Problem)    Diagnosis Date Noted   • **New onset seizure [R56.9] 06/14/2017   • Ischemic cardiomyopathy [I25.5] 06/14/2017   • Chronic bronchitis [J42] 06/14/2017      Resolved Hospital Problems    Diagnosis Date Noted Date Resolved   • Pyuria [N39.0] 06/14/2017 06/15/2017   • Seizure [R56.9] 06/14/2017 06/15/2017     Presenting Problem/History of Present Illness  Seizures [R56.9]  Seizure [R56.9]     Chief Complaint on Day of Discharge: seizures    History of Present Illness on Day of Discharge:   Patient is resting in bed without any new complaints or issues.  States she feels great and would like to go home.  Reports not even remember coming to the hospital.  Denies chest pain, shortness of breath or abdominal pain.    Hospital Course  Patient is a 72 y.o. female with past medical history of COPD and CAD with prior MI and ischemic cardiomyopathy (last known EF 25% in October 2016).   Patient was transferred to [facility] from [facility] following witnessed tonic-clonic seizure.  The patient does not recall all events prior to admission.  EMS reported that she was minimally responsive on arrival and ED notes describe the patient was confused during her stay at [facility] ED.  Patient had a witnessed tonic-clonic seizure in the ED and was loaded with Keppra 1000 g IV.  Decision was made to transfer to [facility].  Patient was admitted by the hospital medicine service for further evaluation and management.  Neurology was consulted and EEG and MRI of brain were unremarkable.  Neurology recommends given recent reported memory problems, concern for previously  unrecognized subclinical seizures to continue Keppra for now even though MRI and EEG benign.  Patient to follow-up with neurology in 2-4 weeks as outpatient.  Patient was treated with solumedrol and PRN neb treatments.  She was started on empiric antibiotics.  Will continue doxycycline for total of 7 day treatment course at discharge along with steroid taper for COPD exacerbation.  Patient has improved clinically and is ready for discharge home today.  Echocardiogram was ordered to follow-up on low ejection fraction noted from October 2016.  This had not been obtained prior to discharge today, patient is compensating well and is ready for discharge.  She was instructed to follow-up with primary cardiology or defer to PCP to schedule echo as an outpatient, as this was an incidental finding.  Discharge plans and instructions were reviewed with patient she verbalized understanding.    Procedures Performed:  None       Consults:   Consults     Date and Time Order Name Status Description    6/14/2017 1338 Inpatient Consult to Neurology          Pertinent Test Results:    Results from last 7 days  Lab Units 06/15/17  0459 06/14/17  1205 06/13/17  2111   WBC 10*3/mm3 6.45 8.67 6.63   HEMOGLOBIN g/dL 11.9 11.9 12.1   HEMATOCRIT % 37.1 37.8 37.8   PLATELETS 10*3/mm3 227 206 235       Results from last 7 days  Lab Units 06/15/17  0459 06/14/17  1205 06/13/17  2110   SODIUM mmol/L 133 135 137   POTASSIUM mmol/L 4.0 4.0 4.4   CHLORIDE mmol/L 100 102 100   TOTAL CO2 mmol/L 29.0 28.0 26.1   BUN mg/dL 14 11 17   CREATININE mg/dL 0.50* 0.50* 0.78   GLUCOSE mg/dL 97 76 117*   CALCIUM mg/dL 9.2 9.1 9.7     Imaging Results (last 72 hours)     Procedure Component Value Units Date/Time    MRI Brain Without Contrast [[id number]] Collected:  06/15/17 0900     Updated:  06/15/17 0900    Narrative:       EXAMINATION: MRI BRAIN WO CONTRAST- 06/14/2017     INDICATION: seizure restless legs     TECHNIQUE: Routine multiplanar imaging was  "obtained of the brain without  the administration gadolinium contrast.     COMPARISON: NONE     FINDINGS: Motion artifact degrades some image quality. There is some  increased signal seen scattered throughout the periventricular and  subcortical white matter suggesting chronic small vessel ischemic  change. No hemorrhage or hydrocephalus. No mass, mass effect, or midline  shift. No abnormal extraaxial fluid collections identified. No evidence  of restricted diffusion to suggest evidence of an acute ischemic insult.  Flow voids are preserved in the major intracranial vessels. Mucosal  thickening involving the maxillary sinuses and ethmoid air cells  suggesting a chronic paranasal sinusitis. Globes and orbits are intact.  Pituitary and sella are unremarkable. Craniovertebral junction is  preserved. No evidence of mesial temporal sclerosis. Due to motion  artifact cortical dysplasia cannot be excluded.       Impression:       Suboptimal imaging due to patient motion artifact. Chronic  small vessel ischemic changes seen throughout the periventricular and  subcortical white matter with no gross acute intracranial abnormality  identified. Chronic paranasal sinusitis.     D:  06/15/2017  E:  06/15/2017               Condition on Discharge:  stable    Physical Exam on Discharge:/52 (BP Location: Left arm, Patient Position: Lying)  Pulse 81  Temp 97.5 °F (36.4 °C) (Oral)   Resp 20  Ht 64\" (162.6 cm)  Wt 129 lb 12.8 oz (58.9 kg)  SpO2 96%  BMI 22.28 kg/m2  Physical Exam  Constitutional: no acute distress, awake, alert, drowsy  Eyes: PERRLA, sclerae anicteric, no conjunctival injection  Neck: supple, no thyromegaly, trachea midline  Respiratory: Diffuse inspiratory/expiratory rhonchi, no wheezing or rales nonlabored respirations   Cardiovascular: RRR, no murmurs, rubs, or gallops, palpable pedal pulses bilaterally  Gastrointestinal: Positive bowel sounds, soft, nontender, nondistended  Musculoskeletal: No " bilateral ankle edema, no clubbing or cyanosis to bilateral lower extremities  Psychiatric: oriented x 3, appropriate affect, cooperative  Neurologic: Strength symmetric in all extremities, Cranial Nerves grossly intact to confrontation     Discharge Disposition  Home or Self Care    Discharge Medications   Nicole Barber   [place] Medication Instructions ZOË:[id number]    Printed on:06/15/17 1089   Medication Information                      albuterol (PROVENTIL HFA;VENTOLIN HFA) 108 (90 BASE) MCG/ACT inhaler  Inhale 2 puffs every 4 (four) hours as needed for wheezing.             budesonide (PULMICORT) 0.5 MG/2ML nebulizer solution  Take 0.5 mg by nebulization 2 (Two) Times a Day.             clonazePAM (KlonoPIN) 0.5 MG tablet  Take 0.5 mg by mouth 2 (Two) Times a Day.             doxycycline (VIBRAMYCIN) 100 MG capsule  Take 1 capsule by mouth Every 12 (Twelve) Hours for 6 days. Indications: Upper Respiratory Tract Infection             HYDROcodone-acetaminophen (NORCO) 5-325 MG per tablet  Take 1 tablet by mouth 2 (Two) Times a Day.             levETIRAcetam (KEPPRA) 500 MG tablet  Take 1 tablet by mouth Every 12 (Twelve) Hours.             nortriptyline (PAMELOR) 25 MG capsule  Take 50 mg by mouth Every Night.             predniSONE (DELTASONE) 10 MG tablet  Take 40 mg x 3 days, then 30 mg x 2 days, then 20 mg x 2 days, then 10 mg x 2 days then stop.             rOPINIRole (REQUIP) 1 MG tablet  Take 1 mg by mouth Every Night. Take 1 hour before bedtime.             traMADol (ULTRAM) 50 MG tablet  Take 50 mg by mouth Every 8 (Eight) Hours As Needed.               Discharge Diet:   Diet Instructions     Diet: Regular, Consistent Carbohydrate, Cardiac; Thin Liquids, No Restrictions       Discharge Diet:   Regular  Consistent Carbohydrate  Cardiac      Fluid Consistency:  Thin Liquids, No Restrictions               Discharge Care Plan / Instructions:    Activity at Discharge:   Activity Instructions     Activity as  Tolerated           Activity as Tolerated                   Follow-up Appointments  No future appointments.  Additional Instructions for the Follow-ups that You Need to Schedule     Discharge Follow-up with PCP    As directed    Follow Up Details:  within 1 week for hospital follow up       Discharge Follow-up with Specialty    As directed    Specialty:  Neurology   Follow Up Details:  2-4 weeks               Test Results Pending at Discharge    Linnette Merino, LAMONT 06/15/17 2:33 PM    Time: Discharge 40 min    Please note that portions of this note may have been completed with a voice recognition program. Efforts were made to edit the dictations, but occasionally words are mistranscribed.  I supervised care of the patient on day of discharge with direct care provided by the advanced care provider (APC).

## 2017-06-15 NOTE — PROGRESS NOTES
"Neurology       Patient Care Team:  LAMONT Brewer as PCP - General (Family Medicine)  LAMONT Alicia as PCP - Claims Attributed    Chief complaint seizures      Subjective .     History:  No further seizures. Feels ok and eager to go home. Breathing not bad right now.   No family present.    ROS: No fever, chest pain, headache    Objective     Vital Signs   Blood pressure 126/52, pulse 81, temperature 97.5 °F (36.4 °C), temperature source Oral, resp. rate 20, height 64\" (162.6 cm), weight 129 lb 12.8 oz (58.9 kg), SpO2 96 %.    Physical Exam:              Neuro: wd elderly ww in nad, alert, fluent, appropriate, attentive  EOMI face symm TML  Moves all ext well, coordination intact    Results Review:              Brain MRI personally reviewed, agree with interpretation, no acute or DWI abnormalities appreciated  EEG normal except excessively drowsy    Assessment/Plan     1. New onset seizures -- no recurrence on LVT. Tolerating so far; potential SEs d/w pt and family, particularly mood. With unremarkable EEG and MRI, continuing AED is not \"compulsory,\" but given #2 below, would continue for now.  2  Memory loss with negative w/u to date -- new seizures raise the possibility that she's been having nonconvulsive seizures that could be affecting memory. Would monitor for potential improvement on Keppra.    Pt prefers to f/u with neurologist in [place] -- recommended she get referral from PCP for neurology in next 4-6 weeks.    I discussed the patients findings and my recommendations with patient    Lou Sandhu MD  06/15/17  2:34 PM    "

## 2017-06-16 LAB — OLIGOCLONAL BANDS.IT SER+CSF QL: (no result)

## 2017-06-17 LAB
BACTERIA SPEC AEROBE CULT: NO GROWTH
GRAM STN SPEC: NORMAL

## 2017-06-18 LAB — BACTERIA SPEC AEROBE CULT: NORMAL

## 2017-06-19 LAB
BACTERIA SPEC AEROBE CULT: NORMAL
BACTERIA SPEC ANAEROBE CULT: NORMAL

## 2017-07-18 ENCOUNTER — TRANSCRIBE ORDERS (OUTPATIENT)
Dept: ADMINISTRATIVE | Facility: HOSPITAL | Age: 72
End: 2017-07-18

## 2017-07-18 DIAGNOSIS — R53.83 OTHER FATIGUE: ICD-10-CM

## 2017-07-18 DIAGNOSIS — R41.3 OTHER AMNESIA: ICD-10-CM

## 2017-07-18 DIAGNOSIS — R00.2 PALPITATION: ICD-10-CM

## 2017-07-18 DIAGNOSIS — I25.799: ICD-10-CM

## 2017-07-18 DIAGNOSIS — R56.9 CONVULSIONS, UNSPECIFIED CONVULSION TYPE (HCC): ICD-10-CM

## 2017-07-18 DIAGNOSIS — I25.9 MYOCARDIAL ISCHEMIA: Primary | ICD-10-CM

## 2017-07-18 DIAGNOSIS — R56.9 SEIZURES (HCC): ICD-10-CM

## 2017-07-26 LAB
ACID FAST STN SPEC: NEGATIVE
BACTERIA SPEC AEROBE CULT: NEGATIVE
BACTERIA SPEC AEROBE CULT: NORMAL
CAP MANDATED REFLEX TO CULTURE: NORMAL
CRYPTOC AG CSF QL IA.RAPID: NEGATIVE
FUNGUS SPEC CULT: NORMAL
FUNGUS SPEC CULT: NORMAL
FUNGUS SPEC FUNGUS STN: NORMAL
HSV1 DNA SPEC QL NAA+PROBE: NEGATIVE
HSV2 DNA SPEC QL NAA+PROBE: NEGATIVE
REAGIN AB CSF QL: NON REACTIVE
SPECIMEN PREPARATION: NORMAL
SPECIMEN STATUS: NORMAL

## 2017-08-21 ENCOUNTER — APPOINTMENT (OUTPATIENT)
Dept: CARDIOLOGY | Facility: HOSPITAL | Age: 72
End: 2017-08-21

## 2017-08-22 ENCOUNTER — APPOINTMENT (OUTPATIENT)
Dept: GENERAL RADIOLOGY | Facility: HOSPITAL | Age: 72
End: 2017-08-22

## 2017-08-22 ENCOUNTER — APPOINTMENT (OUTPATIENT)
Dept: CARDIOLOGY | Facility: HOSPITAL | Age: 72
End: 2017-08-22

## 2017-08-22 ENCOUNTER — APPOINTMENT (OUTPATIENT)
Dept: CT IMAGING | Facility: HOSPITAL | Age: 72
End: 2017-08-22

## 2017-08-22 ENCOUNTER — HOSPITAL ENCOUNTER (EMERGENCY)
Facility: HOSPITAL | Age: 72
Discharge: HOME | End: 2017-08-22
Attending: FAMILY MEDICINE | Admitting: FAMILY MEDICINE

## 2017-08-22 VITALS
RESPIRATION RATE: 16 BRPM | HEIGHT: 62 IN | TEMPERATURE: 98.6 F | OXYGEN SATURATION: 98 % | DIASTOLIC BLOOD PRESSURE: 75 MMHG | HEART RATE: 88 BPM | BODY MASS INDEX: 22.08 KG/M2 | SYSTOLIC BLOOD PRESSURE: 162 MMHG | WEIGHT: 120 LBS

## 2017-08-22 DIAGNOSIS — G89.29 OTHER CHRONIC PAIN: ICD-10-CM

## 2017-08-22 DIAGNOSIS — R56.9 SEIZURE (HCC): Primary | ICD-10-CM

## 2017-08-22 LAB
6-ACETYL MORPHINE: NEGATIVE
A-A DO2: 20.3 MMHG (ref 0–300)
ALBUMIN SERPL-MCNC: 4.2 G/DL (ref 3.4–4.8)
ALBUMIN/GLOB SERPL: 1.1 G/DL (ref 1.5–2.5)
ALP SERPL-CCNC: 76 U/L (ref 35–104)
ALT SERPL W P-5'-P-CCNC: 18 U/L (ref 10–36)
AMPHET+METHAMPHET UR QL: NEGATIVE
AMYLASE SERPL-CCNC: 66 U/L (ref 28–100)
ANION GAP SERPL CALCULATED.3IONS-SCNC: 16 MMOL/L (ref 3.6–11.2)
APTT PPP: 29.1 SECONDS (ref 23.8–36.1)
ARTERIAL PATENCY WRIST A: ABNORMAL
AST SERPL-CCNC: 29 U/L (ref 10–30)
ATMOSPHERIC PRESS: 729 MMHG
BARBITURATES UR QL SCN: NEGATIVE
BASE EXCESS BLDA CALC-SCNC: -0.8 MMOL/L
BASOPHILS # BLD AUTO: 0.03 10*3/MM3 (ref 0–0.3)
BASOPHILS NFR BLD AUTO: 0.4 % (ref 0–2)
BDY SITE: ABNORMAL
BENZODIAZ UR QL SCN: NEGATIVE
BILIRUB SERPL-MCNC: 0.3 MG/DL (ref 0.2–1.8)
BILIRUB UR QL STRIP: NEGATIVE
BODY TEMPERATURE: 98.6 C
BUN BLD-MCNC: 10 MG/DL (ref 7–21)
BUN/CREAT SERPL: 12.8 (ref 7–25)
BUPRENORPHINE SERPL-MCNC: NEGATIVE NG/ML
CALCIUM SPEC-SCNC: 9.6 MG/DL (ref 7.7–10)
CANNABINOIDS SERPL QL: NEGATIVE
CHLORIDE SERPL-SCNC: 98 MMOL/L (ref 99–112)
CK MB SERPL-CCNC: 3.37 NG/ML (ref 0–5)
CK MB SERPL-RTO: 2.6 % (ref 0–3)
CK SERPL-CCNC: 128 U/L (ref 24–173)
CLARITY UR: CLEAR
CO2 SERPL-SCNC: 20 MMOL/L (ref 24.3–31.9)
COCAINE UR QL: NEGATIVE
COHGB MFR BLD: 1.2 % (ref 0–5)
COLOR UR: YELLOW
CREAT BLD-MCNC: 0.78 MG/DL (ref 0.43–1.29)
CRP SERPL-MCNC: <0.5 MG/DL (ref 0–0.99)
D-LACTATE SERPL-SCNC: 12 MMOL/L (ref 0.5–2)
DEPRECATED RDW RBC AUTO: 45.4 FL (ref 37–54)
EOSINOPHIL # BLD AUTO: 0.43 10*3/MM3 (ref 0–0.7)
EOSINOPHIL NFR BLD AUTO: 5.1 % (ref 0–7)
ERYTHROCYTE [DISTWIDTH] IN BLOOD BY AUTOMATED COUNT: 13.7 % (ref 11.5–14.5)
GFR SERPL CREATININE-BSD FRML MDRD: 72 ML/MIN/1.73
GLOBULIN UR ELPH-MCNC: 4 GM/DL
GLUCOSE BLD-MCNC: 108 MG/DL (ref 70–110)
GLUCOSE BLDC GLUCOMTR-MCNC: 86 MG/DL (ref 70–130)
GLUCOSE UR STRIP-MCNC: NEGATIVE MG/DL
HCO3 BLDA-SCNC: 24.2 MMOL/L (ref 22–26)
HCT VFR BLD AUTO: 40.9 % (ref 37–47)
HCT VFR BLD CALC: 39 % (ref 37–47)
HGB BLD-MCNC: 13.1 G/DL (ref 12–16)
HGB BLDA-MCNC: 13.1 G/DL (ref 12–16)
HGB UR QL STRIP.AUTO: NEGATIVE
HOROWITZ INDEX BLD+IHG-RTO: 21 %
IMM GRANULOCYTES # BLD: 0 10*3/MM3 (ref 0–0.03)
IMM GRANULOCYTES NFR BLD: 0 % (ref 0–0.5)
INR PPP: 1.13 (ref 0.9–1.1)
KETONES UR QL STRIP: NEGATIVE
LEUKOCYTE ESTERASE UR QL STRIP.AUTO: NEGATIVE
LIPASE SERPL-CCNC: 28 U/L (ref 13–60)
LYMPHOCYTES # BLD AUTO: 3.47 10*3/MM3 (ref 1–3)
LYMPHOCYTES NFR BLD AUTO: 41.2 % (ref 16–46)
MCH RBC QN AUTO: 29 PG (ref 27–33)
MCHC RBC AUTO-ENTMCNC: 32 G/DL (ref 33–37)
MCV RBC AUTO: 90.5 FL (ref 80–94)
METHADONE UR QL SCN: NEGATIVE
METHGB BLD QL: 0.3 % (ref 0–3)
MODALITY: ABNORMAL
MONOCYTES # BLD AUTO: 0.38 10*3/MM3 (ref 0.1–0.9)
MONOCYTES NFR BLD AUTO: 4.5 % (ref 0–12)
NEUTROPHILS # BLD AUTO: 4.11 10*3/MM3 (ref 1.4–6.5)
NEUTROPHILS NFR BLD AUTO: 48.8 % (ref 40–75)
NITRITE UR QL STRIP: NEGATIVE
OPIATES UR QL: NEGATIVE
OSMOLALITY SERPL CALC.SUM OF ELEC: 267.8 MOSM/KG (ref 273–305)
OXYCODONE UR QL SCN: NEGATIVE
OXYHGB MFR BLDV: 93.1 % (ref 85–100)
PCO2 BLDA: 41 MM HG (ref 35–45)
PCP UR QL SCN: NEGATIVE
PH BLDA: 7.39 PH UNITS (ref 7.35–7.45)
PH UR STRIP.AUTO: 7 [PH] (ref 5–8)
PLATELET # BLD AUTO: 278 10*3/MM3 (ref 130–400)
PMV BLD AUTO: 10 FL (ref 6–10)
PO2 BLDA: 73.8 MM HG (ref 80–100)
POTASSIUM BLD-SCNC: 4.5 MMOL/L (ref 3.5–5.3)
PROT SERPL-MCNC: 8.2 G/DL (ref 6–8)
PROT UR QL STRIP: NEGATIVE
PROTHROMBIN TIME: 14.7 SECONDS (ref 11–15.4)
RBC # BLD AUTO: 4.52 10*6/MM3 (ref 4.2–5.4)
SAO2 % BLDCOA: 94.5 % (ref 90–100)
SODIUM BLD-SCNC: 134 MMOL/L (ref 135–153)
SP GR UR STRIP: 1.01 (ref 1–1.03)
TROPONIN I SERPL-MCNC: 0.01 NG/ML
TROPONIN I SERPL-MCNC: <0.006 NG/ML
UROBILINOGEN UR QL STRIP: NORMAL
WBC NRBC COR # BLD: 8.42 10*3/MM3 (ref 4.5–12.5)

## 2017-08-22 PROCEDURE — 87186 SC STD MICRODIL/AGAR DIL: CPT | Performed by: FAMILY MEDICINE

## 2017-08-22 PROCEDURE — 93005 ELECTROCARDIOGRAM TRACING: CPT | Performed by: FAMILY MEDICINE

## 2017-08-22 PROCEDURE — 36600 WITHDRAWAL OF ARTERIAL BLOOD: CPT | Performed by: FAMILY MEDICINE

## 2017-08-22 PROCEDURE — 80307 DRUG TEST PRSMV CHEM ANLYZR: CPT | Performed by: FAMILY MEDICINE

## 2017-08-22 PROCEDURE — 80053 COMPREHEN METABOLIC PANEL: CPT | Performed by: FAMILY MEDICINE

## 2017-08-22 PROCEDURE — 83605 ASSAY OF LACTIC ACID: CPT | Performed by: FAMILY MEDICINE

## 2017-08-22 PROCEDURE — 82150 ASSAY OF AMYLASE: CPT | Performed by: FAMILY MEDICINE

## 2017-08-22 PROCEDURE — 96375 TX/PRO/DX INJ NEW DRUG ADDON: CPT

## 2017-08-22 PROCEDURE — 87147 CULTURE TYPE IMMUNOLOGIC: CPT | Performed by: FAMILY MEDICINE

## 2017-08-22 PROCEDURE — 86140 C-REACTIVE PROTEIN: CPT | Performed by: FAMILY MEDICINE

## 2017-08-22 PROCEDURE — 82805 BLOOD GASES W/O2 SATURATION: CPT | Performed by: FAMILY MEDICINE

## 2017-08-22 PROCEDURE — 82375 ASSAY CARBOXYHB QUANT: CPT | Performed by: FAMILY MEDICINE

## 2017-08-22 PROCEDURE — 87086 URINE CULTURE/COLONY COUNT: CPT | Performed by: FAMILY MEDICINE

## 2017-08-22 PROCEDURE — 70450 CT HEAD/BRAIN W/O DYE: CPT

## 2017-08-22 PROCEDURE — 83050 HGB METHEMOGLOBIN QUAN: CPT | Performed by: FAMILY MEDICINE

## 2017-08-22 PROCEDURE — 25010000002 MEPERIDINE PER 100 MG: Performed by: FAMILY MEDICINE

## 2017-08-22 PROCEDURE — 70450 CT HEAD/BRAIN W/O DYE: CPT | Performed by: RADIOLOGY

## 2017-08-22 PROCEDURE — 82550 ASSAY OF CK (CPK): CPT | Performed by: FAMILY MEDICINE

## 2017-08-22 PROCEDURE — 71010 XR CHEST 1 VW: CPT | Performed by: RADIOLOGY

## 2017-08-22 PROCEDURE — 85610 PROTHROMBIN TIME: CPT | Performed by: FAMILY MEDICINE

## 2017-08-22 PROCEDURE — 36415 COLL VENOUS BLD VENIPUNCTURE: CPT

## 2017-08-22 PROCEDURE — 82962 GLUCOSE BLOOD TEST: CPT

## 2017-08-22 PROCEDURE — 99284 EMERGENCY DEPT VISIT MOD MDM: CPT

## 2017-08-22 PROCEDURE — 71010 HC CHEST PA OR AP: CPT

## 2017-08-22 PROCEDURE — 81003 URINALYSIS AUTO W/O SCOPE: CPT | Performed by: FAMILY MEDICINE

## 2017-08-22 PROCEDURE — 85025 COMPLETE CBC W/AUTO DIFF WBC: CPT | Performed by: FAMILY MEDICINE

## 2017-08-22 PROCEDURE — 93010 ELECTROCARDIOGRAM REPORT: CPT | Performed by: INTERNAL MEDICINE

## 2017-08-22 PROCEDURE — 82553 CREATINE MB FRACTION: CPT | Performed by: FAMILY MEDICINE

## 2017-08-22 PROCEDURE — 96374 THER/PROPH/DIAG INJ IV PUSH: CPT

## 2017-08-22 PROCEDURE — 87040 BLOOD CULTURE FOR BACTERIA: CPT | Performed by: FAMILY MEDICINE

## 2017-08-22 PROCEDURE — 83690 ASSAY OF LIPASE: CPT | Performed by: FAMILY MEDICINE

## 2017-08-22 PROCEDURE — 96361 HYDRATE IV INFUSION ADD-ON: CPT

## 2017-08-22 PROCEDURE — 25010000002 LEVETRIRACETAM PER 10 MG: Performed by: FAMILY MEDICINE

## 2017-08-22 PROCEDURE — 85730 THROMBOPLASTIN TIME PARTIAL: CPT | Performed by: FAMILY MEDICINE

## 2017-08-22 PROCEDURE — 84484 ASSAY OF TROPONIN QUANT: CPT | Performed by: FAMILY MEDICINE

## 2017-08-22 RX ORDER — HYDROXYZINE PAMOATE 25 MG/1
25 CAPSULE ORAL 3 TIMES DAILY PRN
COMMUNITY
End: 2018-04-19

## 2017-08-22 RX ORDER — LEVETIRACETAM 500 MG/1
500 TABLET ORAL EVERY 12 HOURS
Qty: 61 TABLET | Refills: 0 | Status: SHIPPED | OUTPATIENT
Start: 2017-08-22 | End: 2018-06-28

## 2017-08-22 RX ORDER — HYDROCODONE BITARTRATE AND ACETAMINOPHEN 5; 325 MG/1; MG/1
TABLET ORAL
Status: COMPLETED
Start: 2017-08-22 | End: 2017-08-22

## 2017-08-22 RX ORDER — HYDROCODONE BITARTRATE AND ACETAMINOPHEN 5; 325 MG/1; MG/1
1 TABLET ORAL ONCE
Status: COMPLETED | OUTPATIENT
Start: 2017-08-22 | End: 2017-08-22

## 2017-08-22 RX ORDER — SODIUM CHLORIDE 0.9 % (FLUSH) 0.9 %
10 SYRINGE (ML) INJECTION AS NEEDED
Status: DISCONTINUED | OUTPATIENT
Start: 2017-08-22 | End: 2017-08-22 | Stop reason: HOSPADM

## 2017-08-22 RX ORDER — CITALOPRAM 20 MG/1
20 TABLET ORAL DAILY
COMMUNITY

## 2017-08-22 RX ORDER — NAPROXEN 500 MG/1
500 TABLET ORAL 2 TIMES DAILY WITH MEALS
COMMUNITY

## 2017-08-22 RX ORDER — HYDROCODONE BITARTRATE AND ACETAMINOPHEN 5; 325 MG/1; MG/1
1 TABLET ORAL EVERY 6 HOURS PRN
Qty: 12 TABLET | Refills: 0 | Status: SHIPPED | OUTPATIENT
Start: 2017-08-22 | End: 2018-04-19

## 2017-08-22 RX ORDER — MEPERIDINE HYDROCHLORIDE 25 MG/ML
12.5 INJECTION INTRAMUSCULAR; INTRAVENOUS; SUBCUTANEOUS ONCE
Status: COMPLETED | OUTPATIENT
Start: 2017-08-22 | End: 2017-08-22

## 2017-08-22 RX ADMIN — HYDROCODONE BITARTRATE AND ACETAMINOPHEN 1 TABLET: 5; 325 TABLET ORAL at 15:15

## 2017-08-22 RX ADMIN — SODIUM CHLORIDE 1000 MG: 9 INJECTION, SOLUTION INTRAVENOUS at 14:25

## 2017-08-22 RX ADMIN — MEPERIDINE HYDROCHLORIDE 12.5 MG: 25 INJECTION, SOLUTION INTRAMUSCULAR; INTRAVENOUS; SUBCUTANEOUS at 17:14

## 2017-08-22 RX ADMIN — SODIUM CHLORIDE 1632 ML: 9 INJECTION, SOLUTION INTRAVENOUS at 15:13

## 2017-08-22 NOTE — ED PROVIDER NOTES
Subjective   Patient is a 72 y.o. female presenting with seizures.   History provided by:  Patient  Seizures   Seizure activity on arrival: no    Seizure type:  Grand mal  Preceding symptoms: nausea    Initial focality:  Right-sided  Episode characteristics: abnormal movements and partial responsiveness    Postictal symptoms: confusion    Return to baseline: no    Severity:  Moderate  Timing:  Once  Number of seizures this episode:  1  Progression:  Resolved  Context: medical non-compliance    Recent head injury:  No recent head injuries  PTA treatment:  None  History of seizures: yes    Similar to previous episodes: yes    Date of initial seizure episode:  6/20-17  Date of most recent prior episode:  6/2017  Severity:  Moderate  Seizure control level:  Uncontrolled  Current therapy:  Levetiracetam  Compliance with current therapy:  Poor (pt has been without keppra x3 weeks and klonipin for 4 days)      Review of Systems   Constitutional: Negative for activity change, appetite change, chills and fatigue.   HENT: Negative for congestion.    Eyes: Negative for pain.   Respiratory: Negative for cough, shortness of breath, wheezing and stridor.    Cardiovascular: Negative for chest pain.   Gastrointestinal: Negative for abdominal pain, diarrhea, nausea and vomiting.   Genitourinary: Negative for dysuria.   Musculoskeletal: Negative for arthralgias, myalgias, neck pain and neck stiffness.   Skin: Negative for rash.   Neurological: Positive for seizures. Negative for dizziness, syncope, speech difficulty, weakness and headaches.   Psychiatric/Behavioral: Negative for agitation.       Past Medical History:   Diagnosis Date   • Asthma    • COPD (chronic obstructive pulmonary disease)    • Coronary artery disease    • Coronary artery disease    • Ischemic cardiomyopathy    • Myocardial infarction    • Seizures        Allergies   Allergen Reactions   • Codeine Anaphylaxis   • Penicillins Anaphylaxis   • Talwin [Pentazocine]  Other (See Comments)     seizures       Past Surgical History:   Procedure Laterality Date   • APPENDECTOMY     • HIP SURGERY Left    • TUBAL ABDOMINAL LIGATION         Family History   Problem Relation Age of Onset   • Heart disease Mother    • COPD Mother    • Stroke Father    • Diabetes Brother    • Heart disease Brother        Social History     Social History   • Marital status:      Spouse name: N/A   • Number of children: N/A   • Years of education: N/A     Social History Main Topics   • Smoking status: Former Smoker   • Smokeless tobacco: None   • Alcohol use No   • Drug use: No   • Sexual activity: Not Asked     Other Topics Concern   • None     Social History Narrative   • None           Objective   Physical Exam   Constitutional: She is oriented to person, place, and time. She appears well-developed and well-nourished.   HENT:   Head: Normocephalic and atraumatic.   Right Ear: External ear normal.   Left Ear: External ear normal.   Nose: Nose normal.   Mouth/Throat: Oropharynx is clear and moist.   Eyes: EOM are normal. Pupils are equal, round, and reactive to light.   Neck: Neck supple.   Cardiovascular: Normal rate, regular rhythm, normal heart sounds and intact distal pulses.    Pulmonary/Chest: Effort normal and breath sounds normal. No respiratory distress. She has no wheezes.   Abdominal: Soft.   Musculoskeletal: Normal range of motion. She exhibits no edema, tenderness or deformity.   Neurological: She is alert and oriented to person, place, and time. She displays normal reflexes. No cranial nerve deficit. She exhibits normal muscle tone. Coordination normal.   Nursing note and vitals reviewed.      Procedures         ED Course  ED Course   Value Comment By Time   FIO2: 21 EKG interpretation 1430 normal sinus rhythm 85 bpm left axis deviation nonspecific ST changes  QTc is 521 similar changes noted on previous EKG done in June 2017 no acute ischemic change noted Samia Perales  DO 08/22 1502    Pt is feeling much better states that she didn't realize she shouldn't go without her Klonipin or Keppra. Says she is ready to go home; family agrees that they will monitor medication Samia Perales, DO 08/22 1730                  MDM  Number of Diagnoses or Management Options  Other chronic pain: new and does not require workup  Seizure: new and does not require workup     Amount and/or Complexity of Data Reviewed  Clinical lab tests: ordered and reviewed  Tests in the radiology section of CPT®: ordered and reviewed  Tests in the medicine section of CPT®: ordered and reviewed  Decide to obtain previous medical records or to obtain history from someone other than the patient: yes  Independent visualization of images, tracings, or specimens: yes    Risk of Complications, Morbidity, and/or Mortality  Presenting problems: moderate  Diagnostic procedures: moderate  Management options: moderate    Patient Progress  Patient progress: stable      Final diagnoses:   Seizure   Other chronic pain            Samia Perales, DO  08/23/17 2346

## 2017-08-25 LAB — BACTERIA SPEC AEROBE CULT: NORMAL

## 2017-08-27 LAB
BACTERIA SPEC AEROBE CULT: ABNORMAL
BACTERIA SPEC AEROBE CULT: NORMAL
GRAM STN SPEC: ABNORMAL
ISOLATED FROM: ABNORMAL

## 2017-12-25 ENCOUNTER — HOSPITAL ENCOUNTER (EMERGENCY)
Facility: HOSPITAL | Age: 72
Discharge: HOME | End: 2017-12-25
Attending: EMERGENCY MEDICINE | Admitting: EMERGENCY MEDICINE

## 2017-12-25 ENCOUNTER — APPOINTMENT (OUTPATIENT)
Dept: CT IMAGING | Facility: HOSPITAL | Age: 72
End: 2017-12-25

## 2017-12-25 ENCOUNTER — APPOINTMENT (OUTPATIENT)
Dept: GENERAL RADIOLOGY | Facility: HOSPITAL | Age: 72
End: 2017-12-25

## 2017-12-25 VITALS
BODY MASS INDEX: 23.16 KG/M2 | HEART RATE: 93 BPM | OXYGEN SATURATION: 99 % | TEMPERATURE: 97.9 F | HEIGHT: 65 IN | WEIGHT: 139 LBS | SYSTOLIC BLOOD PRESSURE: 147 MMHG | DIASTOLIC BLOOD PRESSURE: 79 MMHG | RESPIRATION RATE: 18 BRPM

## 2017-12-25 DIAGNOSIS — J18.9 PNEUMONIA OF RIGHT LOWER LOBE DUE TO INFECTIOUS ORGANISM: Primary | ICD-10-CM

## 2017-12-25 LAB
A-A DO2: 22 MMHG (ref 0–300)
A-A DO2: 26.4 MMHG (ref 0–300)
ALBUMIN SERPL-MCNC: 4.1 G/DL (ref 3.4–4.8)
ALBUMIN/GLOB SERPL: 1.2 G/DL (ref 1.5–2.5)
ALP SERPL-CCNC: 95 U/L (ref 35–104)
ALT SERPL W P-5'-P-CCNC: 15 U/L (ref 10–36)
ANION GAP SERPL CALCULATED.3IONS-SCNC: 5 MMOL/L (ref 3.6–11.2)
ARTERIAL PATENCY WRIST A: POSITIVE
ARTERIAL PATENCY WRIST A: POSITIVE
AST SERPL-CCNC: 27 U/L (ref 10–30)
ATMOSPHERIC PRESS: 734 MMHG
ATMOSPHERIC PRESS: 734 MMHG
BASE EXCESS BLDA CALC-SCNC: -0.8 MMOL/L
BASE EXCESS BLDA CALC-SCNC: -1.5 MMOL/L
BASOPHILS # BLD AUTO: 0.03 10*3/MM3 (ref 0–0.3)
BASOPHILS NFR BLD AUTO: 0.3 % (ref 0–2)
BDY SITE: ABNORMAL
BDY SITE: ABNORMAL
BILIRUB SERPL-MCNC: 0.2 MG/DL (ref 0.2–1.8)
BNP SERPL-MCNC: 283 PG/ML (ref 0–100)
BODY TEMPERATURE: 98.6 C
BODY TEMPERATURE: 98.6 C
BUN BLD-MCNC: 11 MG/DL (ref 7–21)
BUN/CREAT SERPL: 15.9 (ref 7–25)
CALCIUM SPEC-SCNC: 8.8 MG/DL (ref 7.7–10)
CHLORIDE SERPL-SCNC: 92 MMOL/L (ref 99–112)
CK MB SERPL-CCNC: 15.27 NG/ML (ref 0–5)
CK MB SERPL-CCNC: 15.97 NG/ML (ref 0–5)
CK MB SERPL-RTO: 4.4 % (ref 0–3)
CK MB SERPL-RTO: 4.6 % (ref 0–3)
CK SERPL-CCNC: 332 U/L (ref 24–173)
CK SERPL-CCNC: 360 U/L (ref 24–173)
CO2 SERPL-SCNC: 28 MMOL/L (ref 24.3–31.9)
COHGB MFR BLD: 0.9 % (ref 0–5)
COHGB MFR BLD: 1 % (ref 0–5)
CREAT BLD-MCNC: 0.69 MG/DL (ref 0.43–1.29)
D-LACTATE SERPL-SCNC: 1.2 MMOL/L (ref 0.5–2)
DEPRECATED RDW RBC AUTO: 42.8 FL (ref 37–54)
EOSINOPHIL # BLD AUTO: 1.82 10*3/MM3 (ref 0–0.7)
EOSINOPHIL NFR BLD AUTO: 17.1 % (ref 0–7)
ERYTHROCYTE [DISTWIDTH] IN BLOOD BY AUTOMATED COUNT: 12.9 % (ref 11.5–14.5)
FLUAV AG NPH QL: NEGATIVE
FLUBV AG NPH QL IA: NEGATIVE
GFR SERPL CREATININE-BSD FRML MDRD: 83 ML/MIN/1.73
GLOBULIN UR ELPH-MCNC: 3.5 GM/DL
GLUCOSE BLD-MCNC: 114 MG/DL (ref 70–110)
HCO3 BLDA-SCNC: 24.3 MMOL/L (ref 22–26)
HCO3 BLDA-SCNC: 26 MMOL/L (ref 22–26)
HCT VFR BLD AUTO: 35.1 % (ref 37–47)
HCT VFR BLD CALC: 31 % (ref 37–47)
HCT VFR BLD CALC: 34 % (ref 37–47)
HGB BLD-MCNC: 11 G/DL (ref 12–16)
HGB BLDA-MCNC: 10.6 G/DL (ref 12–16)
HGB BLDA-MCNC: 11.7 G/DL (ref 12–16)
HOROWITZ INDEX BLD+IHG-RTO: 21 %
HOROWITZ INDEX BLD+IHG-RTO: 28 %
IMM GRANULOCYTES # BLD: 0.02 10*3/MM3 (ref 0–0.03)
IMM GRANULOCYTES NFR BLD: 0.2 % (ref 0–0.5)
LYMPHOCYTES # BLD AUTO: 2.8 10*3/MM3 (ref 1–3)
LYMPHOCYTES NFR BLD AUTO: 26.3 % (ref 16–46)
MCH RBC QN AUTO: 28.5 PG (ref 27–33)
MCHC RBC AUTO-ENTMCNC: 31.3 G/DL (ref 33–37)
MCV RBC AUTO: 90.9 FL (ref 80–94)
METHGB BLD QL: 0.4 % (ref 0–3)
METHGB BLD QL: 0.5 % (ref 0–3)
MODALITY: ABNORMAL
MODALITY: ABNORMAL
MONOCYTES # BLD AUTO: 0.68 10*3/MM3 (ref 0.1–0.9)
MONOCYTES NFR BLD AUTO: 6.4 % (ref 0–12)
NEUTROPHILS # BLD AUTO: 5.3 10*3/MM3 (ref 1.4–6.5)
NEUTROPHILS NFR BLD AUTO: 49.7 % (ref 40–75)
OSMOLALITY SERPL CALC.SUM OF ELEC: 251.8 MOSM/KG (ref 273–305)
OXYHGB MFR BLDV: 90.9 % (ref 85–100)
OXYHGB MFR BLDV: 96.8 % (ref 85–100)
PCO2 BLDA: 45.8 MM HG (ref 35–45)
PCO2 BLDA: 52.9 MM HG (ref 35–45)
PH BLDA: 7.31 PH UNITS (ref 7.35–7.45)
PH BLDA: 7.34 PH UNITS (ref 7.35–7.45)
PLATELET # BLD AUTO: 404 10*3/MM3 (ref 130–400)
PMV BLD AUTO: 8.8 FL (ref 6–10)
PO2 BLDA: 107.9 MM HG (ref 80–100)
PO2 BLDA: 63 MM HG (ref 80–100)
POTASSIUM BLD-SCNC: 5 MMOL/L (ref 3.5–5.3)
PROT SERPL-MCNC: 7.6 G/DL (ref 6–8)
RBC # BLD AUTO: 3.86 10*6/MM3 (ref 4.2–5.4)
SAO2 % BLDCOA: 92.2 % (ref 90–100)
SAO2 % BLDCOA: 98.2 % (ref 90–100)
SODIUM BLD-SCNC: 125 MMOL/L (ref 135–153)
TROPONIN I SERPL-MCNC: 0.01 NG/ML
TROPONIN I SERPL-MCNC: 0.03 NG/ML
WBC NRBC COR # BLD: 10.65 10*3/MM3 (ref 4.5–12.5)

## 2017-12-25 PROCEDURE — 87804 INFLUENZA ASSAY W/OPTIC: CPT | Performed by: EMERGENCY MEDICINE

## 2017-12-25 PROCEDURE — 94640 AIRWAY INHALATION TREATMENT: CPT

## 2017-12-25 PROCEDURE — 82805 BLOOD GASES W/O2 SATURATION: CPT | Performed by: EMERGENCY MEDICINE

## 2017-12-25 PROCEDURE — 96375 TX/PRO/DX INJ NEW DRUG ADDON: CPT

## 2017-12-25 PROCEDURE — 96374 THER/PROPH/DIAG INJ IV PUSH: CPT

## 2017-12-25 PROCEDURE — 71275 CT ANGIOGRAPHY CHEST: CPT | Performed by: RADIOLOGY

## 2017-12-25 PROCEDURE — 71010 HC CHEST PA OR AP: CPT

## 2017-12-25 PROCEDURE — 25010000002 KETOROLAC TROMETHAMINE PER 15 MG: Performed by: EMERGENCY MEDICINE

## 2017-12-25 PROCEDURE — 80053 COMPREHEN METABOLIC PANEL: CPT | Performed by: EMERGENCY MEDICINE

## 2017-12-25 PROCEDURE — 94799 UNLISTED PULMONARY SVC/PX: CPT

## 2017-12-25 PROCEDURE — 25010000002 LORAZEPAM PER 2 MG

## 2017-12-25 PROCEDURE — 71275 CT ANGIOGRAPHY CHEST: CPT

## 2017-12-25 PROCEDURE — 82553 CREATINE MB FRACTION: CPT | Performed by: EMERGENCY MEDICINE

## 2017-12-25 PROCEDURE — 85025 COMPLETE CBC W/AUTO DIFF WBC: CPT | Performed by: EMERGENCY MEDICINE

## 2017-12-25 PROCEDURE — 82375 ASSAY CARBOXYHB QUANT: CPT | Performed by: EMERGENCY MEDICINE

## 2017-12-25 PROCEDURE — 93010 ELECTROCARDIOGRAM REPORT: CPT | Performed by: INTERNAL MEDICINE

## 2017-12-25 PROCEDURE — 87040 BLOOD CULTURE FOR BACTERIA: CPT | Performed by: EMERGENCY MEDICINE

## 2017-12-25 PROCEDURE — 93005 ELECTROCARDIOGRAM TRACING: CPT | Performed by: EMERGENCY MEDICINE

## 2017-12-25 PROCEDURE — 83605 ASSAY OF LACTIC ACID: CPT | Performed by: EMERGENCY MEDICINE

## 2017-12-25 PROCEDURE — 84484 ASSAY OF TROPONIN QUANT: CPT | Performed by: EMERGENCY MEDICINE

## 2017-12-25 PROCEDURE — 71010 XR CHEST 1 VW: CPT | Performed by: RADIOLOGY

## 2017-12-25 PROCEDURE — 25010000002 METHYLPREDNISOLONE PER 125 MG: Performed by: EMERGENCY MEDICINE

## 2017-12-25 PROCEDURE — 83880 ASSAY OF NATRIURETIC PEPTIDE: CPT | Performed by: EMERGENCY MEDICINE

## 2017-12-25 PROCEDURE — 36600 WITHDRAWAL OF ARTERIAL BLOOD: CPT | Performed by: EMERGENCY MEDICINE

## 2017-12-25 PROCEDURE — 82550 ASSAY OF CK (CPK): CPT | Performed by: EMERGENCY MEDICINE

## 2017-12-25 PROCEDURE — 99285 EMERGENCY DEPT VISIT HI MDM: CPT

## 2017-12-25 PROCEDURE — 83050 HGB METHEMOGLOBIN QUAN: CPT | Performed by: EMERGENCY MEDICINE

## 2017-12-25 PROCEDURE — 0 IOPAMIDOL PER 1 ML: Performed by: EMERGENCY MEDICINE

## 2017-12-25 RX ORDER — ALBUTEROL SULFATE 90 UG/1
4 AEROSOL, METERED RESPIRATORY (INHALATION) EVERY 4 HOURS PRN
Status: DISCONTINUED | OUTPATIENT
Start: 2017-12-25 | End: 2017-12-26 | Stop reason: HOSPADM

## 2017-12-25 RX ORDER — PREDNISONE 20 MG/1
20 TABLET ORAL 3 TIMES DAILY
Qty: 15 TABLET | Refills: 0 | Status: SHIPPED | OUTPATIENT
Start: 2017-12-25 | End: 2017-12-30

## 2017-12-25 RX ORDER — ALBUTEROL SULFATE 90 UG/1
2-4 AEROSOL, METERED RESPIRATORY (INHALATION) EVERY 4 HOURS PRN
Qty: 1 INHALER | Refills: 0 | Status: SHIPPED | OUTPATIENT
Start: 2017-12-25 | End: 2018-04-19

## 2017-12-25 RX ORDER — ALBUTEROL SULFATE 2.5 MG/3ML
2.5 SOLUTION RESPIRATORY (INHALATION) ONCE
Status: COMPLETED | OUTPATIENT
Start: 2017-12-25 | End: 2017-12-25

## 2017-12-25 RX ORDER — SODIUM CHLORIDE 0.9 % (FLUSH) 0.9 %
10 SYRINGE (ML) INJECTION AS NEEDED
Status: DISCONTINUED | OUTPATIENT
Start: 2017-12-25 | End: 2017-12-26 | Stop reason: HOSPADM

## 2017-12-25 RX ORDER — DOXYCYCLINE 100 MG/1
100 CAPSULE ORAL EVERY 12 HOURS SCHEDULED
Qty: 20 CAPSULE | Refills: 0 | Status: SHIPPED | OUTPATIENT
Start: 2017-12-25 | End: 2018-04-19

## 2017-12-25 RX ORDER — GUAIFENESIN AND DEXTROMETHORPHAN HYDROBROMIDE 600; 30 MG/1; MG/1
1 TABLET, EXTENDED RELEASE ORAL 2 TIMES DAILY PRN
Qty: 20 TABLET | Refills: 0 | Status: SHIPPED | OUTPATIENT
Start: 2017-12-25 | End: 2018-04-19

## 2017-12-25 RX ORDER — KETOROLAC TROMETHAMINE 30 MG/ML
15 INJECTION, SOLUTION INTRAMUSCULAR; INTRAVENOUS ONCE
Status: COMPLETED | OUTPATIENT
Start: 2017-12-25 | End: 2017-12-25

## 2017-12-25 RX ORDER — ROPINIROLE 1 MG/1
1 TABLET, FILM COATED ORAL ONCE
Status: COMPLETED | OUTPATIENT
Start: 2017-12-25 | End: 2017-12-25

## 2017-12-25 RX ORDER — IPRATROPIUM BROMIDE AND ALBUTEROL SULFATE 2.5; .5 MG/3ML; MG/3ML
3 SOLUTION RESPIRATORY (INHALATION)
Status: COMPLETED | OUTPATIENT
Start: 2017-12-25 | End: 2017-12-25

## 2017-12-25 RX ORDER — ACETAMINOPHEN 500 MG
1000 TABLET ORAL ONCE
Status: COMPLETED | OUTPATIENT
Start: 2017-12-25 | End: 2017-12-25

## 2017-12-25 RX ORDER — LORAZEPAM 2 MG/ML
1 INJECTION INTRAMUSCULAR ONCE
Status: COMPLETED | OUTPATIENT
Start: 2017-12-25 | End: 2017-12-25

## 2017-12-25 RX ORDER — DOXYCYCLINE 100 MG/1
100 CAPSULE ORAL ONCE
Status: COMPLETED | OUTPATIENT
Start: 2017-12-25 | End: 2017-12-25

## 2017-12-25 RX ORDER — LORAZEPAM 2 MG/ML
INJECTION INTRAMUSCULAR
Status: COMPLETED
Start: 2017-12-25 | End: 2017-12-25

## 2017-12-25 RX ORDER — METHYLPREDNISOLONE SODIUM SUCCINATE 125 MG/2ML
125 INJECTION, POWDER, LYOPHILIZED, FOR SOLUTION INTRAMUSCULAR; INTRAVENOUS ONCE
Status: COMPLETED | OUTPATIENT
Start: 2017-12-25 | End: 2017-12-25

## 2017-12-25 RX ADMIN — ALBUTEROL SULFATE 2.5 MG: 2.5 SOLUTION RESPIRATORY (INHALATION) at 22:52

## 2017-12-25 RX ADMIN — DOXYCYCLINE 100 MG: 100 CAPSULE ORAL at 22:40

## 2017-12-25 RX ADMIN — ROPINIROLE 1 MG: 1 TABLET ORAL at 18:35

## 2017-12-25 RX ADMIN — IPRATROPIUM BROMIDE AND ALBUTEROL SULFATE 3 ML: .5; 3 SOLUTION RESPIRATORY (INHALATION) at 15:40

## 2017-12-25 RX ADMIN — IPRATROPIUM BROMIDE AND ALBUTEROL SULFATE 3 ML: .5; 3 SOLUTION RESPIRATORY (INHALATION) at 16:10

## 2017-12-25 RX ADMIN — ACETAMINOPHEN 1000 MG: 500 TABLET ORAL at 21:27

## 2017-12-25 RX ADMIN — IPRATROPIUM BROMIDE AND ALBUTEROL SULFATE 3 ML: .5; 3 SOLUTION RESPIRATORY (INHALATION) at 16:35

## 2017-12-25 RX ADMIN — LORAZEPAM 1 MG: 2 INJECTION, SOLUTION INTRAMUSCULAR; INTRAVENOUS at 16:29

## 2017-12-25 RX ADMIN — METHYLPREDNISOLONE SODIUM SUCCINATE 125 MG: 125 INJECTION, POWDER, FOR SOLUTION INTRAMUSCULAR; INTRAVENOUS at 15:50

## 2017-12-25 RX ADMIN — IOPAMIDOL 85 ML: 755 INJECTION, SOLUTION INTRAVENOUS at 18:57

## 2017-12-25 RX ADMIN — KETOROLAC TROMETHAMINE 15 MG: 30 INJECTION, SOLUTION INTRAMUSCULAR at 21:25

## 2017-12-25 RX ADMIN — LORAZEPAM 1 MG: 2 INJECTION INTRAMUSCULAR at 16:29

## 2017-12-25 RX ADMIN — ALBUTEROL SULFATE 4 PUFF: 90 AEROSOL, METERED RESPIRATORY (INHALATION) at 22:42

## 2017-12-25 NOTE — ED NOTES
Pt states she feels anxious.  Pt states she takes anxiety medication at home and has not taken any today.  Dr. Majano notified with v/o for Ativan 1 mg iv.  RBVO.  Dr. Majano aware of pt b/p.  Will continue to monitor.      Yoselin Caban RN  12/25/17 0320       Yoselin Caban RN  12/25/17 3278

## 2017-12-25 NOTE — ED NOTES
Pt is calmer at this time and states she feels better.  Wheezing is still present but improved.  Oxygen removed r/t abg results per Dr. Majano verbal order.      Yoselin Caban RN  12/25/17 0134

## 2017-12-25 NOTE — ED NOTES
Dr. Majano notified that pt c/o worsening shortness of breath. Pt continues to have audible wheezing.  Pt is alert/oriented.  Oxygen in use at 2 l/m nc.  Staff at bedside.      Yoselin Caban RN  12/25/17 2141

## 2017-12-25 NOTE — ED NOTES
Pt calls out and requests Requip for her legs.  Dr. Majano notified.      Yoselin Caban, RN  12/25/17 1974

## 2017-12-26 NOTE — ED NOTES
Assisted pt to bedside commode at this time. Pt tolerated well. Assisted patient back to bed and hooked back to all monitoring.      Sissy Jackson RN  12/25/17 7585

## 2017-12-26 NOTE — ED PROVIDER NOTES
Subjective   HPI Comments: Patient is a 72-year-old white female who reports a history of asthma and COPD.  She presents today complaining of a three-day history of worsened shortness of breath and nonproductive cough.  She reports generalized weakness and malaise.  She denies fever, chills, chest pain and abdominal pain, vomiting, diarrhea, bloody stools, other associated symptoms or other complaints.  She reports taking 6 albuterol nebulizer treatments at home prior to coming here today without relief.    Patient is a 72 y.o. female presenting with shortness of breath.   History provided by:  Patient and spouse  Shortness of Breath   Associated symptoms: cough and wheezing    Associated symptoms: no abdominal pain, no chest pain, no claudication, no diaphoresis, no ear pain, no fever, no headaches, no hemoptysis, no neck pain, no sore throat, no sputum production, no syncope and no vomiting        Review of Systems   Constitutional: Negative for chills, diaphoresis and fever.   HENT: Negative for ear pain and sore throat.    Eyes: Negative for photophobia, pain and redness.   Respiratory: Positive for cough, shortness of breath and wheezing. Negative for hemoptysis and sputum production.    Cardiovascular: Negative for chest pain, claudication and syncope.   Gastrointestinal: Negative for abdominal pain, blood in stool and vomiting.   Endocrine: Negative for polydipsia, polyphagia and polyuria.   Genitourinary: Negative for difficulty urinating and flank pain.   Musculoskeletal: Negative for back pain and neck pain.   Skin: Negative for color change and pallor.   Neurological: Negative for syncope and headaches.   Psychiatric/Behavioral: Negative for confusion.   All other systems reviewed and are negative.      Past Medical History:   Diagnosis Date   • Asthma    • COPD (chronic obstructive pulmonary disease)    • Coronary artery disease    • Coronary artery disease    • Ischemic cardiomyopathy    • Myocardial  infarction    • Seizures        Allergies   Allergen Reactions   • Codeine Anaphylaxis   • Penicillins Anaphylaxis   • Talwin [Pentazocine] Other (See Comments)     seizures       Past Surgical History:   Procedure Laterality Date   • APPENDECTOMY     • HIP SURGERY Left    • TUBAL ABDOMINAL LIGATION         Family History   Problem Relation Age of Onset   • Heart disease Mother    • COPD Mother    • Stroke Father    • Diabetes Brother    • Heart disease Brother        Social History     Social History   • Marital status:      Spouse name: N/A   • Number of children: N/A   • Years of education: N/A     Social History Main Topics   • Smoking status: Former Smoker   • Smokeless tobacco: None   • Alcohol use No   • Drug use: No   • Sexual activity: Not Asked     Other Topics Concern   • None     Social History Narrative           Objective   Physical Exam   Constitutional: She is oriented to person, place, and time. She appears well-developed and well-nourished.   A pleasant, elderly white female who appears moderately dyspneic.  She is able to speak complete sentences however.   HENT:   Head: Normocephalic and atraumatic.   Eyes: Conjunctivae are normal. Right eye exhibits no discharge. Left eye exhibits no discharge.   Neck: Normal range of motion. Neck supple. No tracheal deviation present.   Cardiovascular: Regular rhythm and intact distal pulses.    Pulmonary/Chest: She has wheezes (Diffuse expiratory wheezes in all fields.  Breath sounds are moderately diminished throughout.).   Abdominal: Soft. Bowel sounds are normal. She exhibits no distension. There is no tenderness. There is no rebound and no guarding.   Musculoskeletal: Normal range of motion. She exhibits no tenderness.   Neurological: She is alert and oriented to person, place, and time. She exhibits normal muscle tone.   Skin: Skin is warm and dry. She is not diaphoretic.   Psychiatric: Her behavior is normal.   Vitals  reviewed.      Procedures  EKG shows sinus tachycardia with a rate of 138.  There is a nonspecific intraventricular conduction delay with nonspecific ST T abnormalities.  There is possible limb lead reversal; a repeat tracing has been ordered.  EKG is repeated, showing sinus tachycardia with a rate of 102.  There is a right bundle-branch block.  Overall this tracing appears very similar to her most recent tracing on file from August 22 of this year, with the exception of new Q waves in leads V5 and 6 compared to previous tracing from August.         ED Course  ED Course   Comment By Time   Further care is endorsed to Dr. Zhou at this time. Arcenio Majano MD 12/25 1927     CT Chest Pulmonary Embolism With Contrast   ED Interpretation   CT angiography of the chest with IV contrast, PE protocol   Report from virtual radiologic   Impression:   1.  No evidence of pulmonary embolism.   Right lower lobe infiltrates.   Signed Dr. Abraham Madden      XR Chest 1 View   Final Result   Stable radiographic appearance of the chest.       This report was finalized on 12/25/2017 4:39 PM by Dr. Jaime Campos MD.            Labs Reviewed   COMPREHENSIVE METABOLIC PANEL - Abnormal; Notable for the following:        Result Value    Glucose 114 (*)     Sodium 125 (*)     Chloride 92 (*)     A/G Ratio 1.2 (*)     All other components within normal limits    Narrative:     The MDRD GFR formula is only valid for adults with stable renal function between ages 18 and 70.   CK - Abnormal; Notable for the following:     Creatine Kinase 332 (*)     All other components within normal limits   CK MB - Abnormal; Notable for the following:     CKMB 15.27 (*)     All other components within normal limits   BNP (IN-HOUSE) - Abnormal; Notable for the following:     .0 (*)     All other components within normal limits   CBC WITH AUTO DIFFERENTIAL - Abnormal; Notable for the following:     RBC 3.86 (*)     Hemoglobin 11.0 (*)      Hematocrit 35.1 (*)     MCHC 31.3 (*)     Platelets 404 (*)     Eosinophil % 17.1 (*)     Eosinophils, Absolute 1.82 (*)     All other components within normal limits   BLOOD GAS, ARTERIAL W/CO-OXIMETRY - Abnormal; Notable for the following:     pH, Arterial 7.310 (*)     pCO2, Arterial 52.9 (*)     pO2, Arterial 107.9 (*)     Hemoglobin, Blood Gas 11.7 (*)     Hematocrit, Blood Gas 34.0 (*)     All other components within normal limits   OSMOLALITY, CALCULATED - Abnormal; Notable for the following:     Osmolality Calc 251.8 (*)     All other components within normal limits   CKMB INDEX CALCULATION - Abnormal; Notable for the following:     CK-MB Index 4.6 (*)     All other components within normal limits   CK - Abnormal; Notable for the following:     Creatine Kinase 360 (*)     All other components within normal limits   CK MB - Abnormal; Notable for the following:     CKMB 15.97 (*)     All other components within normal limits   BLOOD GAS, ARTERIAL W/CO-OXIMETRY - Abnormal; Notable for the following:     pH, Arterial 7.343 (*)     pCO2, Arterial 45.8 (*)     pO2, Arterial 63.0 (*)     Hemoglobin, Blood Gas 10.6 (*)     Hematocrit, Blood Gas 31.0 (*)     All other components within normal limits   CKMB INDEX CALCULATION - Abnormal; Notable for the following:     CK-MB Index 4.4 (*)     All other components within normal limits   INFLUENZA ANTIGEN, RAPID - Normal   TROPONIN (IN-HOUSE) - Normal    Narrative:     Ultra Troponin I Reference Range:         <=0.039 ng/mL: Negative    0.04-0.779 ng/mL: Indeterminate Range. Suspicious of MI.  Clinical correlation required.       >=0.78  ng/mL: Consistent with myocardial injury.  Clinical correlation required.   LACTIC ACID, PLASMA - Normal   TROPONIN (IN-HOUSE) - Normal    Narrative:     Ultra Troponin I Reference Range:         <=0.039 ng/mL: Negative    0.04-0.779 ng/mL: Indeterminate Range. Suspicious of MI.  Clinical correlation required.       >=0.78  ng/mL:  Consistent with myocardial injury.  Clinical correlation required.   BLOOD CULTURE   BLOOD CULTURE   CBC AND DIFFERENTIAL    Narrative:     The following orders were created for panel order CBC & Differential.  Procedure                               Abnormality         Status                     ---------                               -----------         ------                     CBC Auto Differential[[id number]]        Abnormal            Final result                 Please view results for these tests on the individual orders.        Medication List      START taking these medications          doxycycline 100 MG capsule   Commonly known as:  MONODOX   Take 1 capsule by mouth Every 12 (Twelve) Hours.       guaifenesin-dextromethorphan  MG tablet sustained-release 12 hour   tablet   Take 1 tablet by mouth 2 (Two) Times a Day As Needed (Cough/Congestion).         CHANGE how you take these medications          * albuterol 108 (90 Base) MCG/ACT inhaler   Commonly known as:  PROVENTIL HFA;VENTOLIN HFA   What changed:  Another medication with the same name was added. Make sure   you understand how and when to take each.       * albuterol 108 (90 Base) MCG/ACT inhaler   Commonly known as:  PROVENTIL HFA;VENTOLIN HFA   Inhale 2-4 puffs Every 4 (Four) Hours As Needed for Wheezing or Shortness   of Air.   What changed:  You were already taking a medication with the same name,   and this prescription was added. Make sure you understand how and when to   take each.       HYDROcodone-acetaminophen 5-325 MG per tablet   Commonly known as:  NORCO   What changed:  Another medication with the same name was removed. Continue   taking this medication, and follow the directions you see here.       predniSONE 20 MG tablet   Commonly known as:  DELTASONE   Take 1 tablet by mouth 3 (Three) Times a Day for 5 days.   What changed:    - medication strength  - how much to take  - how to take this  - when to take this  - additional  instructions       * Notice:  This list has 2 medication(s) that are the same as other   medications prescribed for you. Read the directions carefully, and ask   your doctor or other care provider to review them with you.      CONTINUE taking these medications          budesonide 0.5 MG/2ML nebulizer solution   Commonly known as:  PULMICORT       citalopram 20 MG tablet   Commonly known as:  CeleXA       clonazePAM 0.5 MG tablet   Commonly known as:  KlonoPIN       hydrOXYzine 25 MG capsule   Commonly known as:  VISTARIL       levETIRAcetam 500 MG tablet   Commonly known as:  KEPPRA   Take 1 tablet by mouth Every 12 (Twelve) Hours.       naproxen 500 MG tablet   Commonly known as:  NAPROSYN       nortriptyline 25 MG capsule   Commonly known as:  PAMELOR       rOPINIRole 1 MG tablet   Commonly known as:  REQUIP       traMADol 50 MG tablet   Commonly known as:  ULTRAM                 MDM  Number of Diagnoses or Management Options  Pneumonia of right lower lobe due to infectious organism: new and requires workup     Amount and/or Complexity of Data Reviewed  Clinical lab tests: reviewed  Tests in the radiology section of CPT®: reviewed  Decide to obtain previous medical records or to obtain history from someone other than the patient: yes  Discuss the patient with other providers: yes  Independent visualization of images, tracings, or specimens: yes    Risk of Complications, Morbidity, and/or Mortality  Presenting problems: moderate  Diagnostic procedures: high  Management options: moderate    Patient Progress  Patient progress: stable      Final diagnoses:   Pneumonia of right lower lobe due to infectious organism            Jovon Zhou MD  12/26/17 0322

## 2017-12-30 LAB
BACTERIA SPEC AEROBE CULT: NORMAL
BACTERIA SPEC AEROBE CULT: NORMAL

## 2018-04-19 ENCOUNTER — OFFICE VISIT (OUTPATIENT)
Dept: FAMILY MEDICINE CLINIC | Facility: CLINIC | Age: 73
End: 2018-04-19

## 2018-04-19 VITALS
BODY MASS INDEX: 24.16 KG/M2 | WEIGHT: 145 LBS | HEART RATE: 74 BPM | OXYGEN SATURATION: 99 % | DIASTOLIC BLOOD PRESSURE: 68 MMHG | HEIGHT: 65 IN | SYSTOLIC BLOOD PRESSURE: 119 MMHG

## 2018-04-19 DIAGNOSIS — R56.9 SEIZURES (HCC): ICD-10-CM

## 2018-04-19 DIAGNOSIS — G25.81 RESTLESS LEG SYNDROME: ICD-10-CM

## 2018-04-19 DIAGNOSIS — J45.51 SEVERE PERSISTENT ASTHMA WITH EXACERBATION: Primary | ICD-10-CM

## 2018-04-19 DIAGNOSIS — G89.4 CHRONIC PAIN SYNDROME: ICD-10-CM

## 2018-04-19 DIAGNOSIS — R41.3 CONFABULATION: ICD-10-CM

## 2018-04-19 DIAGNOSIS — F41.9 ANXIETY: ICD-10-CM

## 2018-04-19 LAB
ALBUMIN SERPL-MCNC: 3.6 G/DL (ref 3.4–4.8)
ALBUMIN/GLOB SERPL: 1.2 G/DL (ref 1.5–2.5)
ALP SERPL-CCNC: 69 U/L (ref 35–104)
ALT SERPL W P-5'-P-CCNC: 11 U/L (ref 10–36)
ANION GAP SERPL CALCULATED.3IONS-SCNC: 7.1 MMOL/L (ref 3.6–11.2)
AST SERPL-CCNC: 19 U/L (ref 10–30)
BASOPHILS # BLD AUTO: 0.02 10*3/MM3 (ref 0–0.3)
BASOPHILS NFR BLD AUTO: 0.3 % (ref 0–2)
BILIRUB SERPL-MCNC: 0.2 MG/DL (ref 0.2–1.8)
BUN BLD-MCNC: 11 MG/DL (ref 7–21)
BUN/CREAT SERPL: 15.1 (ref 7–25)
CALCIUM SPEC-SCNC: 9 MG/DL (ref 7.7–10)
CHLORIDE SERPL-SCNC: 98 MMOL/L (ref 99–112)
CO2 SERPL-SCNC: 27.9 MMOL/L (ref 24.3–31.9)
CREAT BLD-MCNC: 0.73 MG/DL (ref 0.43–1.29)
DEPRECATED RDW RBC AUTO: 46.9 FL (ref 37–54)
EOSINOPHIL # BLD AUTO: 0.8 10*3/MM3 (ref 0–0.7)
EOSINOPHIL NFR BLD AUTO: 12.1 % (ref 0–7)
ERYTHROCYTE [DISTWIDTH] IN BLOOD BY AUTOMATED COUNT: 15.4 % (ref 11.5–14.5)
GFR SERPL CREATININE-BSD FRML MDRD: 78 ML/MIN/1.73
GLOBULIN UR ELPH-MCNC: 3 GM/DL
GLUCOSE BLD-MCNC: 105 MG/DL (ref 70–110)
HCT VFR BLD AUTO: 32.3 % (ref 37–47)
HGB BLD-MCNC: 10.2 G/DL (ref 12–16)
IMM GRANULOCYTES # BLD: 0 10*3/MM3 (ref 0–0.03)
IMM GRANULOCYTES NFR BLD: 0 % (ref 0–0.5)
LYMPHOCYTES # BLD AUTO: 2.23 10*3/MM3 (ref 1–3)
LYMPHOCYTES NFR BLD AUTO: 33.7 % (ref 16–46)
MAGNESIUM SERPL-MCNC: 2 MG/DL (ref 1.7–2.6)
MCH RBC QN AUTO: 26.9 PG (ref 27–33)
MCHC RBC AUTO-ENTMCNC: 31.6 G/DL (ref 33–37)
MCV RBC AUTO: 85.2 FL (ref 80–94)
MONOCYTES # BLD AUTO: 0.4 10*3/MM3 (ref 0.1–0.9)
MONOCYTES NFR BLD AUTO: 6 % (ref 0–12)
NEUTROPHILS # BLD AUTO: 3.17 10*3/MM3 (ref 1.4–6.5)
NEUTROPHILS NFR BLD AUTO: 47.9 % (ref 40–75)
OSMOLALITY SERPL CALC.SUM OF ELEC: 266.1 MOSM/KG (ref 273–305)
PLATELET # BLD AUTO: 319 10*3/MM3 (ref 130–400)
PMV BLD AUTO: 9.3 FL (ref 6–10)
POTASSIUM BLD-SCNC: 5.2 MMOL/L (ref 3.5–5.3)
PROT SERPL-MCNC: 6.6 G/DL (ref 6–8)
RBC # BLD AUTO: 3.79 10*6/MM3 (ref 4.2–5.4)
SODIUM BLD-SCNC: 133 MMOL/L (ref 135–153)
VIT B12 BLD-MCNC: 312 PG/ML (ref 211–911)
WBC NRBC COR # BLD: 6.62 10*3/MM3 (ref 4.5–12.5)

## 2018-04-19 PROCEDURE — 80053 COMPREHEN METABOLIC PANEL: CPT | Performed by: FAMILY MEDICINE

## 2018-04-19 PROCEDURE — 82607 VITAMIN B-12: CPT | Performed by: FAMILY MEDICINE

## 2018-04-19 PROCEDURE — 36415 COLL VENOUS BLD VENIPUNCTURE: CPT | Performed by: FAMILY MEDICINE

## 2018-04-19 PROCEDURE — 83735 ASSAY OF MAGNESIUM: CPT | Performed by: FAMILY MEDICINE

## 2018-04-19 PROCEDURE — 85025 COMPLETE CBC W/AUTO DIFF WBC: CPT | Performed by: FAMILY MEDICINE

## 2018-04-19 PROCEDURE — 99204 OFFICE O/P NEW MOD 45 MIN: CPT | Performed by: FAMILY MEDICINE

## 2018-04-19 RX ORDER — GABAPENTIN 300 MG/1
300 CAPSULE ORAL 2 TIMES DAILY
COMMUNITY
Start: 2018-04-16

## 2018-04-19 RX ORDER — PREDNISONE 10 MG/1
TABLET ORAL
Qty: 14 TABLET | Refills: 0 | Status: SHIPPED | OUTPATIENT
Start: 2018-04-19 | End: 2018-05-16

## 2018-04-19 RX ORDER — ACETAMINOPHEN AND CODEINE PHOSPHATE 300; 30 MG/1; MG/1
1 TABLET ORAL 2 TIMES DAILY
COMMUNITY
Start: 2018-03-29

## 2018-05-03 NOTE — PROGRESS NOTES
"Nicole Barber     VITALS: Blood pressure 119/68, pulse 74, height 165.1 cm (65\"), weight 65.8 kg (145 lb), SpO2 99 %.    Subjective  Chief Complaint:   Chief Complaint   Patient presents with   • URI   • Wheezing   • Cough        History of Present Illness:  Patient is a 72 y.o.  female with a medical history significant for chronic pain, chronic bronchitis, and anxiety who presents to clinic secondary to establishment of care. She is a very poor historian. She complains today of increased wheezing and nonproductive coughing. Denies any fevers or chills. She denies any ear pain, but she is having congestion. Denies any rhinorrhea, shortness of breath, or chest pain. She states that she is currently on antibiotics, but Epic does not pull in any antibiotics for her pharmacy. She is very vague about the antibiotics - she states that she may have three more days of it, and that she does take it BID.     Patient has Asthma and is currently on pulmicort and albuterol. Denies any side effects of the medications. Denies any shortness of breath, coughing, wheezing, or night coughing.  Exacerbation: Yes  Last utilized albuterol: Today    Patient has depression with anxiety and is currently on celexa 20 mg orally daily and clonazepam 0.5 mg orally BID. Patient states that celexa is not working but that the clonazepam is. Denies any side effects of the medications. Patient states that she is sleeping fairly well and can get out of bed daily to accomplish daily activities. Patient has anhedonia. Mood is stable. Has no feelings of guilt. Has fair concentration and memory ability. Has energy. Is able to make goals. Is not crying a lot. Appetite is fair. Denies any suicidal or homicidal ideations. Does not have any auditory or visual hallucinations.    Patient has chronic pain and is currently on Tylenol #3, gabapentin 300 mg orally BID, naproxen 500 mg orally BID, and nortriptyline 30 mg orally at night. Patient states that these " medications are not working. Denies any side effects of the medications. States that the medications do not control the pain enough so that she can accomplish daily activities. Movement and ambulation are not improved. Denies any sedation from the medications. Positive controlled medication seeking behavior. [facility] has no record of controlled medication seeking behavior. (Ash number: [id number]) . Last UDS was done today. She does see Dr. Perera, but she states that he hasn't been helping her. She tells of a story of how she had a crushed spine as a child and couldn't walk for several months. She is unable to tell me how she crushed her spine. There is a lot of stuttering during this story.     Patient has restless leg syndrome and is currently on requip 1 mg orally at night.     Patient also complains of seizures and is currently on keppra 500 mg orally BID. She cannot tell me how she was diagnosed with seizures, but tells me that she started having seizures 3-4 months ago. She states that either her previous provider or the ER placed her on the keppra then. She has not had a neurology consult. She has not been officially tested for seizures. She states that she has not been having any breakthrough seizures since being placed on the keppra. ? Seeking behavior?.     No complaints about any of the medications.    The following portions of the patient's history were reviewed and updated as appropriate: allergies, current medications, past family history, past medical history, past social history, past surgical history and problem list.    Past Medical History  Past Medical History:   Diagnosis Date   • Asthma    • COPD (chronic obstructive pulmonary disease)    • Coronary artery disease    • Coronary artery disease    • Ischemic cardiomyopathy    • Myocardial infarction    • Seizures        Review of Systems  Constitutional: Denies any recent history of HAs, dizziness, fevers, chills, itching.  Eyes: Denies any changes  in vision. Denies any blurry vision or diplopia.  Ears, Nose, Mouth, Throat: Denies any sore throat.  Cardiovascular: Denies any chest pain, pressure, or palpitations.  Respiratory: Denies any shortness of breath or wheezing.  Gastrointestinal: Denies any abdominal pain, nausea, vomiting, diarrhea, or constipation.  Genitourinary: Denies any changes in urination.  Musculoskeletal: Denies any muscle weakness.  Skin and/or breasts: Denies any rashes.  Neurological: Denies any changes in balance or gait.  Psychiatric: Denies any anxiety, depression, or insomnia. Denies any suicidal or homicidal ideations.  Endocrine: Denies any heat or cold intolerance. Denies any voice changes, polydipsia, or polyuria.  Hematologic/Lymphatic: Denies any anemia or easy bruising.    Surgical History  Past Surgical History:   Procedure Laterality Date   • APPENDECTOMY     • HIP SURGERY Left    • TUBAL ABDOMINAL LIGATION         Family History  Family History   Problem Relation Age of Onset   • Heart disease Mother    • COPD Mother    • Stroke Father    • Diabetes Brother    • Heart disease Brother        Social History  Social History     Social History   • Marital status:      Spouse name: N/A   • Number of children: N/A   • Years of education: N/A     Occupational History   • Not on file.     Social History Main Topics   • Smoking status: Former Smoker   • Smokeless tobacco: Never Used   • Alcohol use No   • Drug use: No   • Sexual activity: Defer     Other Topics Concern   • Not on file     Social History Narrative   • No narrative on file       Objective  Physical Exam  Gen: Patient in NAD. Pleasant and answers appropriately. A&Ox3.    Skin: Warm and dry with normal turgor. No purpura, rashes, or unusual pigmentation noted. Hair is normal in appearance and distribution.    HEENT: NC/AT. No lesions noted. Conjunctiva clear, sclera nonicteric. PERRL. EOMI without nystagmus or strabismus. Fundi appear benign. No hemorrhages or  exudates of eyes. Auditory canals are patent bilaterally without lesions. TMs intact,  nonerythematous, nonbulging without lesions. Nasal mucosa pink, nonerythematous, and nonedematous. Frontal and maxillary sinuses are nontender. O/P nonerythematous and moist without exudate.    Neck: Supple without lymph nodes palpated. FROM.     Lungs: Coarse B/L without rales, rhonchi, crackles, or wheezes.    Heart: RRR. S1 and S2 normal. No S3 or S4. No MRGT.    Abd: Soft, nontender,nondistended. (+)BSx4 quadrants.     Extrem: No CCE. Radial pulses 2+/4 and equal B/L. FROMx4. No bone, joint, or muscle tenderness noted.    Neuro: No focal motor/sensory deficits.    Procedures    Assessment/Plan  Nicole Barber is a 72 y.o. here for establishment of care.  Diagnoses and all orders for this visit:    Severe persistent asthma with exacerbation  -     predniSONE (DELTASONE) 10 MG tablet; Take 30 mg orally daily x 3 days, then 20 mg orally daily x 2 days, then 10 mg orally daily.    Restless leg syndrome  -     Comprehensive Metabolic Panel  -     CBC & Differential  -     Vitamin B12  -     Magnesium  -     CBC Auto Differential  -     Osmolality, Calculated; Future  -     Osmolality, Calculated    Seizures  On keppra 500 mg BID.    Chronic pain  Discussed with patient that it is in her best interest to follow Dr. Perera.    Anxiety    Confabulation    Preventative Medicine  Declines mammogram, colonoscopy, DEXA, pneumo, and flu.     Findings and plans discussed with patient who verbalizes understanding and agreement. Will followup with patient once results are in. Patient to followup at clinic PRN or in one month for further medical followup.    Chelle Pike MD

## 2018-05-15 ENCOUNTER — APPOINTMENT (OUTPATIENT)
Dept: GENERAL RADIOLOGY | Facility: HOSPITAL | Age: 73
End: 2018-05-15

## 2018-05-15 ENCOUNTER — HOSPITAL ENCOUNTER (EMERGENCY)
Facility: HOSPITAL | Age: 73
Discharge: HOME | End: 2018-05-16
Attending: FAMILY MEDICINE | Admitting: FAMILY MEDICINE

## 2018-05-15 DIAGNOSIS — F41.8 ANXIETY ABOUT HEALTH: Primary | ICD-10-CM

## 2018-05-15 DIAGNOSIS — J44.1 COPD WITH ACUTE EXACERBATION (HCC): ICD-10-CM

## 2018-05-15 LAB
ALBUMIN SERPL-MCNC: 4.3 G/DL (ref 3.4–4.8)
ALBUMIN/GLOB SERPL: 1.3 G/DL (ref 1.5–2.5)
ALP SERPL-CCNC: 83 U/L (ref 35–104)
ALT SERPL W P-5'-P-CCNC: 18 U/L (ref 10–36)
ANION GAP SERPL CALCULATED.3IONS-SCNC: 8.5 MMOL/L (ref 3.6–11.2)
AST SERPL-CCNC: 29 U/L (ref 10–30)
BASOPHILS # BLD AUTO: 0.02 10*3/MM3 (ref 0–0.3)
BASOPHILS NFR BLD AUTO: 0.3 % (ref 0–2)
BILIRUB SERPL-MCNC: 0.2 MG/DL (ref 0.2–1.8)
BNP SERPL-MCNC: 238 PG/ML (ref 0–100)
BUN BLD-MCNC: 7 MG/DL (ref 7–21)
BUN/CREAT SERPL: 9.9 (ref 7–25)
CALCIUM SPEC-SCNC: 8.8 MG/DL (ref 7.7–10)
CHLORIDE SERPL-SCNC: 94 MMOL/L (ref 99–112)
CO2 SERPL-SCNC: 28.5 MMOL/L (ref 24.3–31.9)
CREAT BLD-MCNC: 0.71 MG/DL (ref 0.43–1.29)
DEPRECATED RDW RBC AUTO: 42.2 FL (ref 37–54)
EOSINOPHIL # BLD AUTO: 0.74 10*3/MM3 (ref 0–0.7)
EOSINOPHIL NFR BLD AUTO: 11.3 % (ref 0–7)
ERYTHROCYTE [DISTWIDTH] IN BLOOD BY AUTOMATED COUNT: 13.9 % (ref 11.5–14.5)
GFR SERPL CREATININE-BSD FRML MDRD: 81 ML/MIN/1.73
GLOBULIN UR ELPH-MCNC: 3.2 GM/DL
GLUCOSE BLD-MCNC: 92 MG/DL (ref 70–110)
HCT VFR BLD AUTO: 36.8 % (ref 37–47)
HGB BLD-MCNC: 11.6 G/DL (ref 12–16)
IMM GRANULOCYTES # BLD: 0 10*3/MM3 (ref 0–0.03)
IMM GRANULOCYTES NFR BLD: 0 % (ref 0–0.5)
LYMPHOCYTES # BLD AUTO: 1.57 10*3/MM3 (ref 1–3)
LYMPHOCYTES NFR BLD AUTO: 23.9 % (ref 16–46)
MCH RBC QN AUTO: 26.7 PG (ref 27–33)
MCHC RBC AUTO-ENTMCNC: 31.5 G/DL (ref 33–37)
MCV RBC AUTO: 84.8 FL (ref 80–94)
MONOCYTES # BLD AUTO: 0.56 10*3/MM3 (ref 0.1–0.9)
MONOCYTES NFR BLD AUTO: 8.5 % (ref 0–12)
NEUTROPHILS # BLD AUTO: 3.67 10*3/MM3 (ref 1.4–6.5)
NEUTROPHILS NFR BLD AUTO: 56 % (ref 40–75)
OSMOLALITY SERPL CALC.SUM OF ELEC: 260.3 MOSM/KG (ref 273–305)
PLATELET # BLD AUTO: 335 10*3/MM3 (ref 130–400)
PMV BLD AUTO: 8.9 FL (ref 6–10)
POTASSIUM BLD-SCNC: 4.3 MMOL/L (ref 3.5–5.3)
PROT SERPL-MCNC: 7.5 G/DL (ref 6–8)
RBC # BLD AUTO: 4.34 10*6/MM3 (ref 4.2–5.4)
SODIUM BLD-SCNC: 131 MMOL/L (ref 135–153)
TROPONIN I SERPL-MCNC: 0.02 NG/ML
WBC NRBC COR # BLD: 6.56 10*3/MM3 (ref 4.5–12.5)

## 2018-05-15 PROCEDURE — 25010000002 LORAZEPAM PER 2 MG: Performed by: FAMILY MEDICINE

## 2018-05-15 PROCEDURE — 71045 X-RAY EXAM CHEST 1 VIEW: CPT

## 2018-05-15 PROCEDURE — 96375 TX/PRO/DX INJ NEW DRUG ADDON: CPT

## 2018-05-15 PROCEDURE — 25010000002 METHYLPREDNISOLONE PER 125 MG: Performed by: FAMILY MEDICINE

## 2018-05-15 PROCEDURE — 96374 THER/PROPH/DIAG INJ IV PUSH: CPT

## 2018-05-15 PROCEDURE — 85025 COMPLETE CBC W/AUTO DIFF WBC: CPT | Performed by: FAMILY MEDICINE

## 2018-05-15 PROCEDURE — 83880 ASSAY OF NATRIURETIC PEPTIDE: CPT | Performed by: FAMILY MEDICINE

## 2018-05-15 PROCEDURE — 93005 ELECTROCARDIOGRAM TRACING: CPT | Performed by: FAMILY MEDICINE

## 2018-05-15 PROCEDURE — 80053 COMPREHEN METABOLIC PANEL: CPT | Performed by: FAMILY MEDICINE

## 2018-05-15 PROCEDURE — 99285 EMERGENCY DEPT VISIT HI MDM: CPT

## 2018-05-15 PROCEDURE — 94640 AIRWAY INHALATION TREATMENT: CPT

## 2018-05-15 PROCEDURE — 94799 UNLISTED PULMONARY SVC/PX: CPT

## 2018-05-15 PROCEDURE — 84484 ASSAY OF TROPONIN QUANT: CPT | Performed by: FAMILY MEDICINE

## 2018-05-15 PROCEDURE — 71045 X-RAY EXAM CHEST 1 VIEW: CPT | Performed by: RADIOLOGY

## 2018-05-15 RX ORDER — SODIUM CHLORIDE 0.9 % (FLUSH) 0.9 %
10 SYRINGE (ML) INJECTION AS NEEDED
Status: DISCONTINUED | OUTPATIENT
Start: 2018-05-15 | End: 2018-05-16 | Stop reason: HOSPADM

## 2018-05-15 RX ORDER — METHYLPREDNISOLONE SODIUM SUCCINATE 125 MG/2ML
80 INJECTION, POWDER, LYOPHILIZED, FOR SOLUTION INTRAMUSCULAR; INTRAVENOUS ONCE
Status: COMPLETED | OUTPATIENT
Start: 2018-05-15 | End: 2018-05-15

## 2018-05-15 RX ORDER — IPRATROPIUM BROMIDE AND ALBUTEROL SULFATE 2.5; .5 MG/3ML; MG/3ML
3 SOLUTION RESPIRATORY (INHALATION) ONCE
Status: COMPLETED | OUTPATIENT
Start: 2018-05-15 | End: 2018-05-15

## 2018-05-15 RX ORDER — IPRATROPIUM BROMIDE AND ALBUTEROL SULFATE 2.5; .5 MG/3ML; MG/3ML
3 SOLUTION RESPIRATORY (INHALATION) ONCE
Status: COMPLETED | OUTPATIENT
Start: 2018-05-16 | End: 2018-05-16

## 2018-05-15 RX ORDER — LORAZEPAM 2 MG/ML
0.5 INJECTION INTRAMUSCULAR ONCE
Status: COMPLETED | OUTPATIENT
Start: 2018-05-15 | End: 2018-05-15

## 2018-05-15 RX ADMIN — IPRATROPIUM BROMIDE AND ALBUTEROL SULFATE 3 ML: .5; 3 SOLUTION RESPIRATORY (INHALATION) at 23:06

## 2018-05-15 RX ADMIN — METHYLPREDNISOLONE SODIUM SUCCINATE 80 MG: 125 INJECTION, POWDER, FOR SOLUTION INTRAMUSCULAR; INTRAVENOUS at 22:57

## 2018-05-15 RX ADMIN — LORAZEPAM 0.5 MG: 2 INJECTION INTRAMUSCULAR; INTRAVENOUS at 22:56

## 2018-05-16 PROCEDURE — 94799 UNLISTED PULMONARY SVC/PX: CPT

## 2018-05-16 RX ORDER — PREDNISONE 20 MG/1
20 TABLET ORAL 2 TIMES DAILY
Qty: 8 TABLET | Refills: 0 | Status: SHIPPED | OUTPATIENT
Start: 2018-05-17 | End: 2018-05-21

## 2018-05-16 RX ORDER — DOXYCYCLINE 100 MG/1
100 CAPSULE ORAL 2 TIMES DAILY
Qty: 20 CAPSULE | Refills: 0 | Status: SHIPPED | OUTPATIENT
Start: 2018-05-16 | End: 2018-05-26

## 2018-05-16 RX ORDER — IPRATROPIUM BROMIDE AND ALBUTEROL SULFATE 2.5; .5 MG/3ML; MG/3ML
3 SOLUTION RESPIRATORY (INHALATION) EVERY 4 HOURS PRN
Qty: 360 ML | Refills: 0 | Status: SHIPPED | OUTPATIENT
Start: 2018-05-16

## 2018-05-16 RX ADMIN — IPRATROPIUM BROMIDE AND ALBUTEROL SULFATE 3 ML: .5; 3 SOLUTION RESPIRATORY (INHALATION) at 00:11

## 2018-05-16 NOTE — ED NOTES
"Attempted to place patient in lobby to await available bed. Patient states \"I feel like im about to have a seizure and I dont want to have one in front of all those people.\" patient placed in hallway in ED stretcher at this time to await for available bed     Sissy Jackson RN  05/15/18 [id number]    "

## 2018-05-16 NOTE — ED NOTES
Seizure pads placed on ED stretcher at this time. No seizure activity is noted at this time. Patient remains in hallway bed to await an available bed     Sissy Jackson RN  05/15/18 2256

## 2018-05-16 NOTE — ED NOTES
Spouse approached nurses station at this time and states that patient would like ice water, updated that patient is not able to have anything to eat or drink at this time until MD comes and sees patient related to patient complaint of SOB. Patient seen sitting on stretcher speaking with spouse rocking back and forth at this time. MD on way to go and speak with patient.      Franchesca Brown RN  05/15/18 [id number]

## 2018-05-16 NOTE — ED PROVIDER NOTES
"Subjective   History of Present Illness  71 y/o F here w/ SOB and \"feeling like (she) is about to have a seizure.\" Pt normally takes gabapentin for her reported seizure d/o but states she has not had any tonight. Pt's records show that she is also prescribed keppra but her  states that she has not been strictly adherent for the past several months. No CP. No fever/chills. Pt reports h/o COPD and wheeze for several weeks with mild SOB. Pt  reports that pt has not had a seizure today. Her  also states that the pt has been increasingly anxious recently since her clonazepam prescription was recently changed from 1 mg daily to 0.5 mg daily.   Review of Systems   Constitutional: Negative for chills, fatigue and fever.   Eyes: Negative for photophobia and visual disturbance.   Respiratory: Positive for shortness of breath. Negative for cough, chest tightness and wheezing.    Cardiovascular: Negative for chest pain and palpitations.   Gastrointestinal: Negative for abdominal distention and abdominal pain.   Genitourinary: Negative for difficulty urinating and dysuria.   Musculoskeletal: Negative for back pain.   Skin: Negative for color change and pallor.   Neurological: Negative for dizziness, seizures, facial asymmetry, speech difficulty, weakness, light-headedness, numbness and headaches.   Hematological: Does not bruise/bleed easily.   Psychiatric/Behavioral: Negative for confusion and dysphoric mood. The patient is nervous/anxious.    All other systems reviewed and are negative.      Past Medical History:   Diagnosis Date   • Asthma    • COPD (chronic obstructive pulmonary disease)    • Coronary artery disease    • Coronary artery disease    • Ischemic cardiomyopathy    • Myocardial infarction    • Seizures        Allergies   Allergen Reactions   • Penicillins Anaphylaxis   • Talwin [Pentazocine] Other (See Comments)     seizures       Past Surgical History:   Procedure Laterality Date   • " APPENDECTOMY     • HIP SURGERY Left    • TUBAL ABDOMINAL LIGATION         Family History   Problem Relation Age of Onset   • Heart disease Mother    • COPD Mother    • Stroke Father    • Diabetes Brother    • Heart disease Brother        Social History     Social History   • Marital status:      Social History Main Topics   • Smoking status: Former Smoker   • Smokeless tobacco: Never Used   • Alcohol use No   • Drug use: No   • Sexual activity: Defer     Other Topics Concern   • Not on file           Objective   Physical Exam   Constitutional: She is oriented to person, place, and time. She appears well-developed and well-nourished. She is active.   HENT:   Head: Normocephalic and atraumatic.   Right Ear: Hearing, external ear and ear canal normal.   Left Ear: Hearing, external ear and ear canal normal.   Nose: Nose normal.   Mouth/Throat: Uvula is midline, oropharynx is clear and moist and mucous membranes are normal.   Eyes: Conjunctivae, EOM and lids are normal. Pupils are equal, round, and reactive to light.   Neck: Trachea normal, normal range of motion, full passive range of motion without pain and phonation normal. Neck supple.   Cardiovascular: Normal rate, regular rhythm and normal heart sounds.    Pulmonary/Chest: Effort normal. She has wheezes. She has no rhonchi. She has no rales.   Abdominal: Soft. Normal appearance.   Neurological: She is alert and oriented to person, place, and time. GCS eye subscore is 4. GCS verbal subscore is 5. GCS motor subscore is 6.   Skin: Skin is warm, dry and intact. Capillary refill takes less than 2 seconds.   Psychiatric: Her mood appears anxious. Cognition and memory are normal.   Nursing note and vitals reviewed.      Procedures           ED Course  ED Course   Value Comment By Time   ECG 12 Lead NSR, 78 bpm. QTc 478ms. RBBB. No ST segment abnormalities concerning for STEMI.  Tony Virk MD 05/15 4440      No CP before or during ED stay. Pt breathing  improved after two duo-nebs and steroids. Pt was given ativan for her acute anxiety which resolved appropriately. Pt  states that he feels comfortable taking the pt home and that she seems much improved. Pt given prescriptions for COPD exacerbation.            MDM  Number of Diagnoses or Management Options  Anxiety about health: new and requires workup  COPD with acute exacerbation: new and requires workup     Amount and/or Complexity of Data Reviewed  Clinical lab tests: reviewed and ordered  Tests in the radiology section of CPT®: reviewed and ordered  Tests in the medicine section of CPT®: reviewed and ordered  Decide to obtain previous medical records or to obtain history from someone other than the patient: yes  Independent visualization of images, tracings, or specimens: yes    Risk of Complications, Morbidity, and/or Mortality  Presenting problems: high  Diagnostic procedures: high  Management options: high    Patient Progress  Patient progress: stable        Final diagnoses:   Anxiety about health   COPD with acute exacerbation            Tony Virk MD  05/16/18 0051

## 2018-05-17 VITALS
WEIGHT: 130 LBS | SYSTOLIC BLOOD PRESSURE: 154 MMHG | HEART RATE: 72 BPM | HEIGHT: 65 IN | TEMPERATURE: 98.2 F | OXYGEN SATURATION: 97 % | DIASTOLIC BLOOD PRESSURE: 74 MMHG | RESPIRATION RATE: 12 BRPM | BODY MASS INDEX: 21.66 KG/M2

## 2018-06-28 ENCOUNTER — HOSPITAL ENCOUNTER (EMERGENCY)
Facility: HOSPITAL | Age: 73
Discharge: HOME | End: 2018-06-28
Attending: INTERNAL MEDICINE | Admitting: INTERNAL MEDICINE

## 2018-06-28 ENCOUNTER — APPOINTMENT (OUTPATIENT)
Dept: CT IMAGING | Facility: HOSPITAL | Age: 73
End: 2018-06-28

## 2018-06-28 VITALS
SYSTOLIC BLOOD PRESSURE: 149 MMHG | RESPIRATION RATE: 18 BRPM | HEIGHT: 65 IN | WEIGHT: 125 LBS | BODY MASS INDEX: 20.83 KG/M2 | OXYGEN SATURATION: 99 % | HEART RATE: 81 BPM | TEMPERATURE: 98.1 F | DIASTOLIC BLOOD PRESSURE: 81 MMHG

## 2018-06-28 DIAGNOSIS — R56.9 SEIZURE (HCC): Primary | ICD-10-CM

## 2018-06-28 LAB
6-ACETYL MORPHINE: NEGATIVE
ALBUMIN SERPL-MCNC: 3.9 G/DL (ref 3.4–4.8)
ALBUMIN/GLOB SERPL: 1.3 G/DL (ref 1.5–2.5)
ALP SERPL-CCNC: 74 U/L (ref 35–104)
ALT SERPL W P-5'-P-CCNC: 11 U/L (ref 10–36)
AMPHET+METHAMPHET UR QL: NEGATIVE
ANION GAP SERPL CALCULATED.3IONS-SCNC: 10.2 MMOL/L (ref 3.6–11.2)
AST SERPL-CCNC: 19 U/L (ref 10–30)
BARBITURATES UR QL SCN: NEGATIVE
BASOPHILS # BLD AUTO: 0.02 10*3/MM3 (ref 0–0.3)
BASOPHILS NFR BLD AUTO: 0.2 % (ref 0–2)
BENZODIAZ UR QL SCN: NEGATIVE
BILIRUB SERPL-MCNC: 0.1 MG/DL (ref 0.2–1.8)
BILIRUB UR QL STRIP: NEGATIVE
BUN BLD-MCNC: 13 MG/DL (ref 7–21)
BUN/CREAT SERPL: 16.5 (ref 7–25)
BUPRENORPHINE SERPL-MCNC: NEGATIVE NG/ML
CALCIUM SPEC-SCNC: 8.5 MG/DL (ref 7.7–10)
CANNABINOIDS SERPL QL: NEGATIVE
CHLORIDE SERPL-SCNC: 99 MMOL/L (ref 99–112)
CLARITY UR: CLEAR
CO2 SERPL-SCNC: 24.8 MMOL/L (ref 24.3–31.9)
COCAINE UR QL: NEGATIVE
COLOR UR: YELLOW
CREAT BLD-MCNC: 0.79 MG/DL (ref 0.43–1.29)
DEPRECATED RDW RBC AUTO: 48.3 FL (ref 37–54)
EOSINOPHIL # BLD AUTO: 1.54 10*3/MM3 (ref 0–0.7)
EOSINOPHIL NFR BLD AUTO: 17.1 % (ref 0–7)
ERYTHROCYTE [DISTWIDTH] IN BLOOD BY AUTOMATED COUNT: 15.2 % (ref 11.5–14.5)
GFR SERPL CREATININE-BSD FRML MDRD: 71 ML/MIN/1.73
GLOBULIN UR ELPH-MCNC: 3.1 GM/DL
GLUCOSE BLD-MCNC: 96 MG/DL (ref 70–110)
GLUCOSE UR STRIP-MCNC: NEGATIVE MG/DL
HCT VFR BLD AUTO: 36.8 % (ref 37–47)
HGB BLD-MCNC: 11.5 G/DL (ref 12–16)
HGB UR QL STRIP.AUTO: NEGATIVE
IMM GRANULOCYTES # BLD: 0.01 10*3/MM3 (ref 0–0.03)
IMM GRANULOCYTES NFR BLD: 0.1 % (ref 0–0.5)
KETONES UR QL STRIP: NEGATIVE
LEUKOCYTE ESTERASE UR QL STRIP.AUTO: NEGATIVE
LYMPHOCYTES # BLD AUTO: 2.43 10*3/MM3 (ref 1–3)
LYMPHOCYTES NFR BLD AUTO: 27.1 % (ref 16–46)
MCH RBC QN AUTO: 27.1 PG (ref 27–33)
MCHC RBC AUTO-ENTMCNC: 31.3 G/DL (ref 33–37)
MCV RBC AUTO: 86.6 FL (ref 80–94)
METHADONE UR QL SCN: NEGATIVE
MONOCYTES # BLD AUTO: 0.45 10*3/MM3 (ref 0.1–0.9)
MONOCYTES NFR BLD AUTO: 5 % (ref 0–12)
NEUTROPHILS # BLD AUTO: 4.53 10*3/MM3 (ref 1.4–6.5)
NEUTROPHILS NFR BLD AUTO: 50.5 % (ref 40–75)
NITRITE UR QL STRIP: NEGATIVE
OPIATES UR QL: NEGATIVE
OSMOLALITY SERPL CALC.SUM OF ELEC: 268.2 MOSM/KG (ref 273–305)
OXYCODONE UR QL SCN: NEGATIVE
PCP UR QL SCN: NEGATIVE
PH UR STRIP.AUTO: 6 [PH] (ref 5–8)
PLATELET # BLD AUTO: 277 10*3/MM3 (ref 130–400)
PMV BLD AUTO: 9.2 FL (ref 6–10)
POTASSIUM BLD-SCNC: 4.5 MMOL/L (ref 3.5–5.3)
PROT SERPL-MCNC: 7 G/DL (ref 6–8)
PROT UR QL STRIP: NEGATIVE
RBC # BLD AUTO: 4.25 10*6/MM3 (ref 4.2–5.4)
SODIUM BLD-SCNC: 134 MMOL/L (ref 135–153)
SP GR UR STRIP: 1.02 (ref 1–1.03)
TROPONIN I SERPL-MCNC: 0.03 NG/ML
UROBILINOGEN UR QL STRIP: NORMAL
WBC NRBC COR # BLD: 8.98 10*3/MM3 (ref 4.5–12.5)

## 2018-06-28 PROCEDURE — 80307 DRUG TEST PRSMV CHEM ANLYZR: CPT | Performed by: PHYSICIAN ASSISTANT

## 2018-06-28 PROCEDURE — 70450 CT HEAD/BRAIN W/O DYE: CPT | Performed by: RADIOLOGY

## 2018-06-28 PROCEDURE — 80053 COMPREHEN METABOLIC PANEL: CPT | Performed by: PHYSICIAN ASSISTANT

## 2018-06-28 PROCEDURE — 99285 EMERGENCY DEPT VISIT HI MDM: CPT

## 2018-06-28 PROCEDURE — 36415 COLL VENOUS BLD VENIPUNCTURE: CPT

## 2018-06-28 PROCEDURE — 93005 ELECTROCARDIOGRAM TRACING: CPT | Performed by: PHYSICIAN ASSISTANT

## 2018-06-28 PROCEDURE — 81003 URINALYSIS AUTO W/O SCOPE: CPT | Performed by: PHYSICIAN ASSISTANT

## 2018-06-28 PROCEDURE — 80177 DRUG SCRN QUAN LEVETIRACETAM: CPT | Performed by: PHYSICIAN ASSISTANT

## 2018-06-28 PROCEDURE — 93010 ELECTROCARDIOGRAM REPORT: CPT | Performed by: INTERNAL MEDICINE

## 2018-06-28 PROCEDURE — 70450 CT HEAD/BRAIN W/O DYE: CPT

## 2018-06-28 PROCEDURE — 25010000002 LEVETRIRACETAM PER 10 MG: Performed by: PHYSICIAN ASSISTANT

## 2018-06-28 PROCEDURE — 96374 THER/PROPH/DIAG INJ IV PUSH: CPT

## 2018-06-28 PROCEDURE — 84484 ASSAY OF TROPONIN QUANT: CPT | Performed by: PHYSICIAN ASSISTANT

## 2018-06-28 PROCEDURE — 85025 COMPLETE CBC W/AUTO DIFF WBC: CPT | Performed by: PHYSICIAN ASSISTANT

## 2018-06-28 RX ORDER — SODIUM CHLORIDE 0.9 % (FLUSH) 0.9 %
10 SYRINGE (ML) INJECTION AS NEEDED
Status: DISCONTINUED | OUTPATIENT
Start: 2018-06-28 | End: 2018-06-28 | Stop reason: HOSPADM

## 2018-06-28 RX ORDER — LEVETIRACETAM 750 MG/1
TABLET ORAL
Qty: 60 TABLET | Refills: 0 | Status: SHIPPED | OUTPATIENT
Start: 2018-06-28

## 2018-06-28 RX ADMIN — SODIUM CHLORIDE 500 MG: 9 INJECTION, SOLUTION INTRAVENOUS at 12:40

## 2018-07-02 LAB — LEVETIRACETAM SERPL-MCNC: NORMAL UG/ML (ref 10–40)

## 2018-10-18 ENCOUNTER — APPOINTMENT (OUTPATIENT)
Dept: GENERAL RADIOLOGY | Facility: HOSPITAL | Age: 73
End: 2018-10-18

## 2018-10-18 ENCOUNTER — APPOINTMENT (OUTPATIENT)
Dept: CT IMAGING | Facility: HOSPITAL | Age: 73
End: 2018-10-18

## 2018-10-18 ENCOUNTER — HOSPITAL ENCOUNTER (EMERGENCY)
Facility: HOSPITAL | Age: 73
Discharge: HOME | End: 2018-10-18
Attending: EMERGENCY MEDICINE | Admitting: EMERGENCY MEDICINE

## 2018-10-18 VITALS
RESPIRATION RATE: 18 BRPM | BODY MASS INDEX: 24.16 KG/M2 | HEIGHT: 65 IN | DIASTOLIC BLOOD PRESSURE: 83 MMHG | TEMPERATURE: 98 F | SYSTOLIC BLOOD PRESSURE: 137 MMHG | OXYGEN SATURATION: 100 % | HEART RATE: 80 BPM | WEIGHT: 145 LBS

## 2018-10-18 DIAGNOSIS — R56.9 SEIZURE (HCC): Primary | ICD-10-CM

## 2018-10-18 DIAGNOSIS — R56.9 POSTICTAL STATE (HCC): ICD-10-CM

## 2018-10-18 LAB
ALBUMIN SERPL-MCNC: 4.9 G/DL (ref 3.4–4.8)
ALBUMIN/GLOB SERPL: 1.3 G/DL (ref 1.5–2.5)
ALP SERPL-CCNC: 89 U/L (ref 35–104)
ALT SERPL W P-5'-P-CCNC: 17 U/L (ref 10–36)
ANION GAP SERPL CALCULATED.3IONS-SCNC: 14.4 MMOL/L (ref 3.6–11.2)
APTT PPP: 25.5 SECONDS (ref 23.8–36.1)
AST SERPL-CCNC: 23 U/L (ref 10–30)
BASOPHILS # BLD AUTO: 0.02 10*3/MM3 (ref 0–0.3)
BASOPHILS NFR BLD AUTO: 0.3 % (ref 0–2)
BILIRUB SERPL-MCNC: 0.4 MG/DL (ref 0.2–1.8)
BUN BLD-MCNC: 19 MG/DL (ref 7–21)
BUN/CREAT SERPL: 19.6 (ref 7–25)
CALCIUM SPEC-SCNC: 10 MG/DL (ref 7.7–10)
CHLORIDE SERPL-SCNC: 96 MMOL/L (ref 99–112)
CO2 SERPL-SCNC: 22.6 MMOL/L (ref 24.3–31.9)
CREAT BLD-MCNC: 0.97 MG/DL (ref 0.43–1.29)
DEPRECATED RDW RBC AUTO: 46.9 FL (ref 37–54)
EOSINOPHIL # BLD AUTO: 0.01 10*3/MM3 (ref 0–0.7)
EOSINOPHIL NFR BLD AUTO: 0.1 % (ref 0–7)
ERYTHROCYTE [DISTWIDTH] IN BLOOD BY AUTOMATED COUNT: 14.5 % (ref 11.5–14.5)
GFR SERPL CREATININE-BSD FRML MDRD: 56 ML/MIN/1.73
GLOBULIN UR ELPH-MCNC: 3.7 GM/DL
GLUCOSE BLD-MCNC: 125 MG/DL (ref 70–110)
HCT VFR BLD AUTO: 41.9 % (ref 35–65)
HGB BLD-MCNC: 13.1 G/DL (ref 12–22)
HOLD SPECIMEN: NORMAL
HOLD SPECIMEN: NORMAL
IMM GRANULOCYTES # BLD: 0.02 10*3/MM3 (ref 0–0.03)
IMM GRANULOCYTES NFR BLD: 0.3 % (ref 0–0.5)
INR PPP: 0.99 (ref 0.9–1.1)
LYMPHOCYTES # BLD AUTO: 1.48 10*3/MM3 (ref 1–3)
LYMPHOCYTES NFR BLD AUTO: 21.8 % (ref 16–46)
MCH RBC QN AUTO: 27.5 PG (ref 27–33)
MCHC RBC AUTO-ENTMCNC: 31.3 G/DL (ref 33–37)
MCV RBC AUTO: 87.8 FL (ref 80–94)
MONOCYTES # BLD AUTO: 0.11 10*3/MM3 (ref 0.1–0.9)
MONOCYTES NFR BLD AUTO: 1.6 % (ref 0–12)
NEUTROPHILS # BLD AUTO: 5.14 10*3/MM3 (ref 1.4–6.5)
NEUTROPHILS NFR BLD AUTO: 75.9 % (ref 40–75)
OSMOLALITY SERPL CALC.SUM OF ELEC: 270.1 MOSM/KG (ref 273–305)
PLATELET # BLD AUTO: 394 10*3/MM3 (ref 130–400)
PMV BLD AUTO: 9.6 FL (ref 6–10)
POTASSIUM BLD-SCNC: 4 MMOL/L (ref 3.5–5.3)
PROT SERPL-MCNC: 8.6 G/DL (ref 6–8)
PROTHROMBIN TIME: 13.3 SECONDS (ref 11–15.4)
RBC # BLD AUTO: 4.77 10*6/MM3 (ref 4.2–5.4)
SODIUM BLD-SCNC: 133 MMOL/L (ref 135–153)
TROPONIN I SERPL-MCNC: 0.02 NG/ML
TROPONIN I SERPL-MCNC: 0.03 NG/ML
WBC NRBC COR # BLD: 6.78 10*3/MM3 (ref 5.5–30)
WHOLE BLOOD HOLD SPECIMEN: NORMAL

## 2018-10-18 PROCEDURE — 96376 TX/PRO/DX INJ SAME DRUG ADON: CPT

## 2018-10-18 PROCEDURE — 85025 COMPLETE CBC W/AUTO DIFF WBC: CPT | Performed by: EMERGENCY MEDICINE

## 2018-10-18 PROCEDURE — 84484 ASSAY OF TROPONIN QUANT: CPT | Performed by: EMERGENCY MEDICINE

## 2018-10-18 PROCEDURE — 96365 THER/PROPH/DIAG IV INF INIT: CPT

## 2018-10-18 PROCEDURE — 96375 TX/PRO/DX INJ NEW DRUG ADDON: CPT

## 2018-10-18 PROCEDURE — 70450 CT HEAD/BRAIN W/O DYE: CPT

## 2018-10-18 PROCEDURE — 80053 COMPREHEN METABOLIC PANEL: CPT | Performed by: EMERGENCY MEDICINE

## 2018-10-18 PROCEDURE — 25010000002 LEVETRIRACETAM PER 10 MG: Performed by: EMERGENCY MEDICINE

## 2018-10-18 PROCEDURE — 85610 PROTHROMBIN TIME: CPT | Performed by: EMERGENCY MEDICINE

## 2018-10-18 PROCEDURE — 99284 EMERGENCY DEPT VISIT MOD MDM: CPT | Performed by: INTERNAL MEDICINE

## 2018-10-18 PROCEDURE — 72125 CT NECK SPINE W/O DYE: CPT

## 2018-10-18 PROCEDURE — 70450 CT HEAD/BRAIN W/O DYE: CPT | Performed by: RADIOLOGY

## 2018-10-18 PROCEDURE — 93005 ELECTROCARDIOGRAM TRACING: CPT | Performed by: EMERGENCY MEDICINE

## 2018-10-18 PROCEDURE — 85730 THROMBOPLASTIN TIME PARTIAL: CPT | Performed by: EMERGENCY MEDICINE

## 2018-10-18 PROCEDURE — 99285 EMERGENCY DEPT VISIT HI MDM: CPT

## 2018-10-18 PROCEDURE — 72125 CT NECK SPINE W/O DYE: CPT | Performed by: RADIOLOGY

## 2018-10-18 PROCEDURE — 71045 X-RAY EXAM CHEST 1 VIEW: CPT | Performed by: RADIOLOGY

## 2018-10-18 PROCEDURE — 25010000002 HEPARIN (PORCINE) PER 1000 UNITS: Performed by: EMERGENCY MEDICINE

## 2018-10-18 PROCEDURE — 96366 THER/PROPH/DIAG IV INF ADDON: CPT

## 2018-10-18 PROCEDURE — 71045 X-RAY EXAM CHEST 1 VIEW: CPT

## 2018-10-18 RX ORDER — ACETAMINOPHEN 325 MG/1
650 TABLET ORAL ONCE
Status: COMPLETED | OUTPATIENT
Start: 2018-10-18 | End: 2018-10-18

## 2018-10-18 RX ORDER — HEPARIN SODIUM 5000 [USP'U]/ML
60 INJECTION, SOLUTION INTRAVENOUS; SUBCUTANEOUS ONCE
Status: COMPLETED | OUTPATIENT
Start: 2018-10-18 | End: 2018-10-18

## 2018-10-18 RX ORDER — HEPARIN SODIUM 10000 [USP'U]/100ML
12 INJECTION, SOLUTION INTRAVENOUS
Status: DISCONTINUED | OUTPATIENT
Start: 2018-10-18 | End: 2018-10-18 | Stop reason: HOSPADM

## 2018-10-18 RX ORDER — ASPIRIN 81 MG/1
324 TABLET, CHEWABLE ORAL ONCE
Status: COMPLETED | OUTPATIENT
Start: 2018-10-18 | End: 2018-10-18

## 2018-10-18 RX ORDER — SODIUM CHLORIDE 0.9 % (FLUSH) 0.9 %
10 SYRINGE (ML) INJECTION AS NEEDED
Status: DISCONTINUED | OUTPATIENT
Start: 2018-10-18 | End: 2018-10-18 | Stop reason: HOSPADM

## 2018-10-18 RX ADMIN — ASPIRIN 324 MG: 81 TABLET, CHEWABLE ORAL at 15:37

## 2018-10-18 RX ADMIN — HEPARIN SODIUM 12 UNITS/KG/HR: 10000 INJECTION, SOLUTION INTRAVENOUS at 15:42

## 2018-10-18 RX ADMIN — HEPARIN SODIUM 3900 UNITS: 5000 INJECTION, SOLUTION INTRAVENOUS; SUBCUTANEOUS at 15:39

## 2018-10-18 RX ADMIN — SODIUM CHLORIDE 500 MG: 9 INJECTION, SOLUTION INTRAVENOUS at 19:05

## 2018-10-18 RX ADMIN — ACETAMINOPHEN 650 MG: 325 TABLET ORAL at 19:06

## 2018-10-18 NOTE — ED NOTES
Dr irene reports pt to remain in er at this time. Will notify us when  arrives and cardiologist can review a pt history.      Monika Gilliam, RN  10/18/18 6941

## 2018-10-18 NOTE — ED NOTES
Pt bilateral groin area clipped and prepped for cath lab.      Monika Gilliam, RN  10/18/18 5929

## 2018-10-18 NOTE — ED NOTES
and daughter now at bedside, reports hx of seizure. Dr aponte at bedside. Will await new orders.      Monika Gilliam, RN  10/18/18 2436

## 2018-10-18 NOTE — ED NOTES
Dr aponte advises to call cardiologist once troponin results.      Monika Gilliam, RN  10/18/18 6703

## 2018-10-18 NOTE — ED NOTES
Vs stable, ns noted with st elevation, md made aware, pt denies pain. rr 18, 100% on 2lpm nc, pt alert and oriented, skin pwd, no respiratory distress. Fall precautions maintained.     Monika Gilliam, RN  10/18/18 8943

## 2018-10-18 NOTE — ED NOTES
Occupational Therapy Daily Treatment     Visit Count: 8  Plan of Care Dates: Initial: 7/6/2018 Through: 9/28/2018  Insurance Information: MEDICARE B     Therapist to complete G Codes  Emulator total prior to evaluation: -0-  KX modifier at 16th treatment visit or earlier if previous visits used this calendar year.   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 MEDICARE  2. MEDICO     Date of Surgery: 6/29/18; Surgery performed: Total Shoulder; Physician Guidelines: yes  Diagnosis Precautions:   Conservative Protocol:  Weeks 1-3      Precautions     • Days from surgical dates to onset of rehab: 7 days   • Brace/Sling Use: 6 weeks   • Ice and pain modalities as needed       ROM     • Pendulum exercises   • No active IR   • PROM flexion/abduction LIMIT to 90 degrees x 4 weeks, passively bias humerus in IR   • PROM in ER LIMIT to 15-20 degrees with humerus adducted   • AROM for elbow, wrist and hand       Strength     • Scapular elevation, depression, retraction, protraction                 Weeks 3-6      ROM     • PROM shoulder flexion/scaption with humerus in neutral rotation   • AROM IR   • AROM ER LIMIT to 15-20 degrees with humerus adducted   • AVOID combined ER and ABD        Chart reviewed: Relevant co-morbidities, allergies, tests and medications: otherwise healthy; history of Non-Hodgkins lymphoma in remission    SUBJECTIVE   Scar still feels a bit tender when pressure is applied.  Using CPM as directed.    Current Pain: 2-3/10.    Functional Change: Not using left arm in a functional manner at this point.      OBJECTIVE     PROM of left shoulder flexion:  May start working up to WFL by 6 weeks  PROM of left  Pt complains of headache, md made aware, new orders noted.     Monika Gilliam, KASSANDRA  10/18/18 6959     shoulder scaption:  May start working up to WFL by 6 weeks  PROM of left shoulder ER: 15 °    Range of Motion (degrees)  [] All motions within functional/normal limits except those noted.   [x] Only those motions that were assessed are noted.  [] Uninvolved extremity motion within functional/normal limits.    Left Left PROM   Shoulder                    Date Initial 7/30/18   Flexion 60  112   Extension      Abduction 55   107              Adduction    0   Internal Rotation 25 PROM    External  Rotation 10 14       Treatment   Therapeutic Exercise:   Supine shoulder flexion and scaption  (neutral bias) (112 in flexion and 107 abduction)  Supine shoulder external rotation with shoulder placed in adduction-end limit of 15 degrees  Elbow and forearm motion    Manual Therapy:    Scar massage and Soft tissue mobilization to anterior/lateral shoulder.    Current Home Program (not performed this date except as noted above):   Pendulum's and elbow/wrist motion  Self passive ROM in flexion with shoulder neutral bias     ASSESSMENT   Continues to make good gains.  Gradually progressing per protocol to WFL shoulder flexion and scaption.  Excellent compliance from patient.  Instructed in scar massage.  Pain after treatment:210  Result of above outlined education: Verbalizes understanding and Demonstrates understanding    Goals:  To be obtained by end of this plan of care:  1. Patient independent with modified and progressed home exercise program.  2. Patient will decrease involved shoulder pain/symptoms to 2/10  to aid in normalization of upper extremity movements to aid activities of independent daily living.   3. Patient will increase involved shoulder active range of motion to abduction 140°, flexion 140° to aid in normalization of upper extremity movements to aid activities of independent daily living.   4. Patient will increase involved shoulder strength to 4/5 to aid in normalization of upper extremity movements to aid  activities of independent daily living.  5. Patient will be able to reach over head with minimal pain/difficulty to improve function in cooking, reaching into cupboard, grooming.  6. Patient will be able to sleep 6 hours without disruption from pain.   7. DASH: Patient will complete form to reflect an improved score from initial score of 50 to less than or equal to 35 (scored 0-100; a higher score indicates greater disability) to indicate pt reported improvement in function/disability/impairment (minimal detectable change: 15 points).     PLAN   Recheck ROM for MD visit August 9, 2018.  Frequency/Duration: 2 times per week for 12 weeks with tapering as the patient progresses  Skilled training and instruction for the following interventions:  Manual Therapy (16381)  Therapeutic Activity (01330)  Therapeutic Exercise (27235)  Heat/Cold (28043)  Ultrasound/Phonophoresis (12431)  Strapping  Splinting/Orthotics       THERAPY DAILY BILLING   Primary Insurance:  MEDICARE  Secondary Insurance: MEDICO    Evaluation Procedures:  No evaluation codes were used on this date of service    Timed Procedures:  Manual Therapy, 15 minutes  Therapeutic Exercise, 30 minutes    Untimed Procedures:  No untimed codes were used on this date of service    Total Treatment Time: 45 minutes        G-Code:  G-Code Score ABN form  reporting not required this treatment session  Modifier based on outcome measure(s)/functional testing/clinical judgement as listed above    The referring provider's electronic or written signature on the evaluation authorizes the therapy plan of care and certifies the need for these services, furnished under this plan of care while under their care.  Referring provider signature on file

## 2018-10-18 NOTE — ED NOTES
Pt alert with confusion, oriented to self, pt remains on 2lpm nc, remains in sinus rhythm with st elevation. Pt denies pain. 20 gauge in right hand remains patent with no s/s of infiltration. 18 gauge in left ac remains patent with heparin infusing at 7.89ml/hr. poc updated. Pt waiting on  to arrive to er.      Monika Gilliam, KASSANDRA  10/18/18 3893

## 2018-10-18 NOTE — ED PROVIDER NOTES
Subjective   White female of unknown age presents emergency department after possible seizure at [facility].  He is unable to give any history.  She is only able to say that her name is Nicole Bull.  According to the report of EMS the patient had a witnessed episode of shaking and fell hitting her head on the ladies fitting room desk at [facility].  She had EKG in route which was suspicious for acute MI and one push system was activated.  No other history is available.  There are no family members present.            Review of Systems   Unable to perform ROS: Mental status change       No past medical history on file.    Allergies   Allergen Reactions   • Penicillins Anaphylaxis       No past surgical history on file.    No family history on file.    Social History     Social History   • Marital status: Single     Social History Main Topics   • Drug use: Unknown     Other Topics Concern   • Not on file           Objective   Physical Exam   Constitutional: She appears well-developed and well-nourished.   Cardiovascular: Normal rate and regular rhythm.    Pulmonary/Chest: Effort normal and breath sounds normal. No respiratory distress.   Abdominal: Soft. She exhibits no distension. There is no tenderness.   Musculoskeletal: Normal range of motion. She exhibits no deformity.   Neurological: She is alert.   Skin: Skin is cool. Turgor is normal.   Nursing note and vitals reviewed.      Procedures         Results for orders placed or performed during the hospital encounter of 10/18/18   Comprehensive Metabolic Panel   Result Value Ref Range    Glucose 125 (H) 70 - 110 mg/dL    BUN 19 7 - 21 mg/dL    Creatinine 0.97 0.43 - 1.29 mg/dL    Sodium 133 (L) 135 - 153 mmol/L    Potassium 4.0 3.5 - 5.3 mmol/L    Chloride 96 (L) 99 - 112 mmol/L    CO2 22.6 (L) 24.3 - 31.9 mmol/L    Calcium 10.0 7.7 - 10.0 mg/dL    Total Protein 8.6 (H) 6.0 - 8.0 g/dL    Albumin 4.90 (H) 3.40 - 4.80 g/dL    ALT (SGPT) 17 10 - 36 U/L    AST (SGOT) 23 10 -  30 U/L    Alkaline Phosphatase 89 35 - 104 U/L    Total Bilirubin 0.4 0.2 - 1.8 mg/dL    eGFR Non African Amer 56 (L) >60 mL/min/1.73    Globulin 3.7 gm/dL    A/G Ratio 1.3 (L) 1.5 - 2.5 g/dL    BUN/Creatinine Ratio 19.6 7.0 - 25.0    Anion Gap 14.4 (H) 3.6 - 11.2 mmol/L   Troponin   Result Value Ref Range    Troponin I 0.018 <=0.040 ng/mL   CBC Auto Differential   Result Value Ref Range    WBC 6.78 5.50 - 30.00 10*3/mm3    RBC 4.77 4.20 - 5.40 10*6/mm3    Hemoglobin 13.1 12.0 - 22.0 g/dL    Hematocrit 41.9 35.0 - 65.0 %    MCV 87.8 80.0 - 94.0 fL    MCH 27.5 27.0 - 33.0 pg    MCHC 31.3 (L) 33.0 - 37.0 g/dL    RDW 14.5 11.5 - 14.5 %    RDW-SD 46.9 37.0 - 54.0 fl    MPV 9.6 6.0 - 10.0 fL    Platelets 394 130 - 400 10*3/mm3    Neutrophil % 75.9 (H) 40.0 - 75.0 %    Lymphocyte % 21.8 16.0 - 46.0 %    Monocyte % 1.6 0.0 - 12.0 %    Eosinophil % 0.1 0.0 - 7.0 %    Basophil % 0.3 0.0 - 2.0 %    Immature Grans % 0.3 0.0 - 0.5 %    Neutrophils, Absolute 5.14 1.40 - 6.50 10*3/mm3    Lymphocytes, Absolute 1.48 1.00 - 3.00 10*3/mm3    Monocytes, Absolute 0.11 0.10 - 0.90 10*3/mm3    Eosinophils, Absolute 0.01 0.00 - 0.70 10*3/mm3    Basophils, Absolute 0.02 0.00 - 0.30 10*3/mm3    Immature Grans, Absolute 0.02 0.00 - 0.03 10*3/mm3   Protime-INR   Result Value Ref Range    Protime 13.3 11.0 - 15.4 Seconds    INR 0.99 0.90 - 1.10   aPTT   Result Value Ref Range    PTT 25.5 23.8 - 36.1 seconds   Osmolality, Calculated   Result Value Ref Range    Osmolality Calc 270.1 (L) 273.0 - 305.0 mOsm/kg   Troponin   Result Value Ref Range    Troponin I 0.033 <=0.040 ng/mL   Lavender Top   Result Value Ref Range    Extra Tube hold for add-on    Light Blue Top   Result Value Ref Range    Extra Tube hold for add-on    Green Top (Gel)   Result Value Ref Range    Extra Tube Hold for add-ons.    Lavender Top   Result Value Ref Range    Extra Tube hold for add-on    Gold Top - SST   Result Value Ref Range    Extra Tube Hold for add-ons.      Ct  Cervical Spine Without Contrast    Result Date: 10/18/2018  Narrative: EXAMINATION: CT CERVICAL SPINE WO CONTRAST-  CLINICAL INDICATION:     Spine fracture, traumatic, cervical  TECHNIQUE: Multiple axial CT images of the entire cervical spine (to include the cervicothoracic junction) were obtained without contrast administration. Reformatted images in the coronal and/or sagittal plane(s) were generated from the axial data set to facilitate diagnostic accuracy and/or surgical planning.  Radiation dose reduction techniques were utilized per ALARA protocol. Automated exposure control was initiated through either or [employer] or DoseRight software packages by  protocol.   385.35 mGy.cm  COMPARISON:  None.   FINDINGS: Degenerative changes are present within the cervical spine, but resulting in no significant bony stenosis of the spinal canal. No acute fracture or malalignment of the cervical spine is identified.      Impression: No acute fracture of the cervical spine.  This report was finalized on 10/18/2018 3:34 PM by Dr. Jaime Campos MD.      Xr Chest 1 View    Result Date: 10/18/2018  Narrative: EXAMINATION: XR CHEST 1 VW-  CLINICAL INDICATION:     cp  TECHNIQUE:  XR CHEST 1 VW-  COMPARISON: NONE  FINDINGS: The lungs remain aerated. Heart and mediastinum contours are unremarkable. No pleural effusion. No pneumothorax. Bony and soft tissue structures are unremarkable.      Impression: No radiographic evidence of acute cardiac or pulmonary disease.  This report was finalized on 10/18/2018 3:53 PM by Dr. Jaime Campos MD.      Ct Head Without Contrast Stroke Protocol    Result Date: 10/18/2018  Narrative: EXAMINATION: CT HEAD WO CONTRAST STROKE PROTOCOL-  CLINICAL INDICATION:     fall  COMPARISON:    None  Technique: Multiple CT axial images were obtained through the level of the brain without IV contrast administration. Reformatted images in the coronal and/or sagittal plane(s) were generated from the axial  data set to facilitate diagnostic accuracy and/or surgical planning.  Radiation dose reduction techniques were utilized per ALARA protocol. Automated exposure control was initiated through either or [employer] or DoseRight software packages by  protocol.   931.76 mGy.cm  FINDINGS:    There is no mass effect or midline shift. No ventriculomegaly. There is no CT evidence of acute vascular territory infarct. No intraparenchymal or extra-axial hemorrhage. Bone windows show no acute osseous abnormality.      Impression: No acute intracranial abnormality. Dr. Esqueda of the emergency department was notified of these findings at 3:35 PM on October 18, 2018 by telephone.  This report was finalized on 10/18/2018 3:35 PM by Dr. Jaime Campos MD.        ED Course  ED Course as of Oct 18 1921   Thu Oct 18, 2018   1551 Patient has been seen by Dr Layton.  He states that the patient is able to answer questions now and says that she has a history of seizures and denied any chest pain.  For this reason he does not wish to take her to the Cath Lab at this time.  [BC]   1756 Have discussed with cardiology in.  Review of her EKG shows this pattern was present in the past.  She is more awake and alert at this time and able to give history.  This is consistent with seizure with postictal confusion, which is now resolved.  If her second troponin is normal plan to discharge home.  [BC]   1823 I discussed with patient.  She states that she has been taking her Keppra only irregularly.  She is not sure when she last took this.  She doesn't take it because it makes her sick, so she will stop taking it for a few days at a time.  Otherwise she is without complaints.  [BC]   1913 Doing well, no complaints.  No further seizure activity.  Patient denies any chest pain.  Hemodynamically stable.  Second troponin negative.  Have encouraged her to be compliant with her seizure meds and follow up with neurology.  Otherwise, we will  discharge home.  [BC]      ED Course User Index  [BC] Contreras Esqueda MD                  MDM  Number of Diagnoses or Management Options  ST elevation myocardial infarction (STEMI), unspecified artery (CMS/HCC):   Risk of Complications, Morbidity, and/or Mortality  Presenting problems: high  Diagnostic procedures: moderate  Management options: high          Final diagnoses:   Seizure (CMS/HCC)   Postictal state (CMS/HCC)            Contreras Esqueda MD  10/18/18 1928

## 2018-10-18 NOTE — ED NOTES
Notified dr irene that pt was now alert and oriented, family at bedside and that troponin has resulted. md acknowledged, no new orders given at this time.     Monika Gilliam, KASSANDRA  10/18/18 1717

## 2018-10-18 NOTE — ED NOTES
"md remains at bedside, pt moans and curses, follows commands, but will not answer any questions, when asking pt her , pt states \"i cant remember\" perrl. radioluscent defib pads placed on pt, pt on zoles monitor. Pt remains in sinus rhythm with st elevation.      Monika Gilliam, RN  10/18/18 3336    "

## 2018-10-18 NOTE — CONSULTS
Consults    Patient Identification:    Name:  Nicole Barber  Age:  73 y.o.  Sex:  female  :  1945  MRN:  [id number]  Visit Number:  [id number]  Primary care provider:  Britt Brown APRN    Chief complaint:   Seizure    History of presenting illness:   Pt is a 73 y old female with hx of Seizure disorder, cardiomyopathy, [facility], she passed out, bystander witnessed seizure and called EMS and was brought here. Upon presentation she had CT head and neck and showed no acute pathology. She was awake and oriented and denied any chest pain, sob, palpitations etc.     Field STEMI was activated due to possible ST elevation in anterior leads. Upon reviewing the EKG it showed sinus rhythm with RBBB and ST-T changes which looked unchanged from before. Doesn't meet criteria for acute injury pattern.     Upon reviewing her past eval, she had a Echo in 2016 which showed EF of 25% with apical akinesia and moderate MR.   ---------------------------------------------------------------------------------------------------------------------  Review of Systems   Not able to obtain to mental status changes  ---------------------------------------------------------------------------------------------------------------------   Past History:   Seizure disorder  Cardiomyopathy  No family history on file.  No past medical history on file.  No past surgical history on file.  Social History     Social History   • Marital status: Single     Social History Main Topics   • Drug use: Unknown     Other Topics Concern   • Not on file     ---------------------------------------------------------------------------------------------------------------------   Allergies:  Penicillins  ---------------------------------------------------------------------------------------------------------------------   Prior to Admission Medications     None        Hospital Meds:       heparin  (porcine) 12 Units/kg/hr Last Rate: 12 Units/kg/hr (10/18/18 1542)     ---------------------------------------------------------------------------------------------------------------------   Vital Signs:  Temp:  [98.2 °F (36.8 °C)] 98.2 °F (36.8 °C)  Heart Rate:  [] 86  Resp:  [18] 18  BP: (101-177)/() 172/96  1    10/18/18  1508   Weight: 65.8 kg (145 lb)     Body mass index is 24.13 kg/m².  ---------------------------------------------------------------------------------------------------------------------   Physical exam:   Constitutional:  Well-developed and well-nourished.  No respiratory distress.      HENT:  Head: Normocephalic and atraumatic.  Mouth:  Moist mucous membranes.    Eyes:  Conjunctivae and EOM are normal.  Pupils are equal, round, and reactive to light.  No scleral icterus.  Neck:  Neck supple.  No JVD present.    Cardiovascular:  Normal rate, regular rhythm and normal heart sounds with no murmur.  Pulmonary/Chest:  No respiratory distress, no wheezes, no crackles, with normal breath sounds and good air movement.  Abdominal:  Soft.  Bowel sounds are normal.  No distension and no tenderness.   Musculoskeletal:  No edema, no tenderness, and no deformity.  No red or swollen joints anywhere.    Neurological:  Alert and oriented to person, place, and time.  No cranial nerve deficit.  No tongue deviation.  No facial droop.  No slurred speech.   Skin:  Skin is warm and dry.  No rash noted.  No pallor.   Psychiatric:  Normal mood and affect.  Behavior is normal.  Judgment and thought content normal.   Peripheral vascular:  No edema and strong pulses on all 4 extremities.    ---------------------------------------------------------------------------------------------------------------------     ---------------------------------------------------------------------------------------------------------------------     Results from last 7 days  Lab Units 10/18/18  1518   WBC 10*3/mm3 6.78    HEMOGLOBIN g/dL 13.1   HEMATOCRIT % 41.9   MCV fL 87.8   MCHC g/dL 31.3*   PLATELETS 10*3/mm3 394           Results from last 7 days  Lab Units 10/18/18  1518   SODIUM mmol/L 133*   POTASSIUM mmol/L 4.0   CHLORIDE mmol/L 96*   CO2 mmol/L 22.6*   BUN mg/dL 19   CREATININE mg/dL 0.97   EGFR IF NONAFRICN AM mL/min/1.73 56*   CALCIUM mg/dL 10.0   GLUCOSE mg/dL 125*   ALBUMIN g/dL 4.90*   BILIRUBIN mg/dL 0.4   ALK PHOS U/L 89   AST (SGOT) U/L 23   ALT (SGPT) U/L 17   Estimated Creatinine Clearance: 52.9 mL/min (by C-G formula based on SCr of 0.97 mg/dL).  No results found for: AMMONIA    Results from last 7 days  Lab Units 10/18/18  1518   TROPONIN I ng/mL 0.018         No results found for: HGBA1C  No results found for: TSH, FREET4  No results found for: PREGTESTUR, PREGSERUM, HCG, HCGQUANT  Pain Management Panel     There is no flowsheet data to display.                          ---------------------------------------------------------------------------------------------------------------------   Imaging Results (last 7 days)     Procedure Component Value Units Date/Time    XR Chest 1 View [[id number]] Collected:  10/18/18 1553     Updated:  10/18/18 1235    Narrative:       EXAMINATION: XR CHEST 1 VW-      CLINICAL INDICATION:     cp     TECHNIQUE:  XR CHEST 1 VW-      COMPARISON: NONE      FINDINGS:   The lungs remain aerated.  Heart and mediastinum contours are unremarkable.  No pleural effusion.  No pneumothorax.   Bony and soft tissue structures are unremarkable.       Impression:       No radiographic evidence of acute cardiac or pulmonary  disease.     This report was finalized on 10/18/2018 3:53 PM by Dr. Jaime Campos MD.       CT Head Without Contrast Stroke Protocol [[id number]] Collected:  10/18/18 1534     Updated:  10/18/18 1537    Narrative:       EXAMINATION: CT HEAD WO CONTRAST STROKE PROTOCOL-      CLINICAL INDICATION:     fall     COMPARISON:    None     Technique: Multiple CT axial images were  obtained through the level of  the brain without IV contrast administration. Reformatted images in the  coronal and/or sagittal plane(s) were generated from the axial data set  to facilitate diagnostic accuracy and/or surgical planning.     Radiation dose reduction techniques were utilized per ALARA protocol.  Automated exposure control was initiated through either or [employer] or  DoseRight software packages by  protocol.       931.76 mGy.cm     FINDINGS:    There is no mass effect or midline shift. No  ventriculomegaly. There is no CT evidence of acute vascular territory  infarct. No intraparenchymal or extra-axial hemorrhage. Bone windows  show no acute osseous abnormality.       Impression:       No acute intracranial abnormality.  Dr. Esqueda of the emergency department was notified of these findings at  3:35 PM on October 18, 2018 by telephone.     This report was finalized on 10/18/2018 3:35 PM by Dr. Jaime Campos MD.       CT Cervical Spine Without Contrast [[id number]] Collected:  10/18/18 1533     Updated:  10/18/18 1536    Narrative:       EXAMINATION: CT CERVICAL SPINE WO CONTRAST-      CLINICAL INDICATION:     Spine fracture, traumatic, cervical     TECHNIQUE: Multiple axial CT images of the entire cervical spine (to  include the cervicothoracic junction) were obtained without contrast  administration. Reformatted images in the coronal and/or sagittal  plane(s) were generated from the axial data set to facilitate diagnostic  accuracy and/or surgical planning.      Radiation dose reduction techniques were utilized per ALARA protocol.  Automated exposure control was initiated through either or [employer] or  DoseRight software packages by  protocol.       385.35 mGy.cm     COMPARISON:  None.       FINDINGS: Degenerative changes are present within the cervical spine,  but resulting in no significant bony stenosis of the spinal canal. No  acute fracture or malalignment of the cervical  spine is identified.        Impression:       No acute fracture of the cervical spine.      This report was finalized on 10/18/2018 3:34 PM by Dr. Jaime Campos MD.           ----------------------------------------------------------------------------------------------------------------------  Assessment:   Seizure disorder  False STEMI activation  Cardiomyopathy - EF 25% from 2016, I dont see any evaluation being done in past.      Plan:   Denies any active chest pain, no new EKG changes, troponin 0.012, will follow up her and evaluate for ischemia, needs to be GDMT for cardiomyopathy.   Echo   Thank you for the consult.      Eduardo Layton MD, formerly [facility]  Interventional Cardiology      10/18/18  4:31 PM

## 2018-10-18 NOTE — ED NOTES
Pt is able to tell her name, but is not able to tell her  or any other information.     Fannie Little RN  10/18/18 4854

## 2018-10-18 NOTE — ED NOTES
Cardiologist maria victoria arrives to ed. Pt remains in ct.      Monika Gilliam, KASSANDRA  10/18/18 0244

## 2018-10-18 NOTE — ED NOTES
Pt cannot recall what meds she takes or what pharmacy she uses.     Monika Gilliam, RN  10/18/18 6511

## 2023-02-13 NOTE — ED NOTES
Seizure precautions initiated at this time. Seizure pads x2 placed. Suction set up at bedside     Page Cisneros RN  08/22/17 6885    
no concerns
